# Patient Record
Sex: FEMALE | Race: WHITE | URBAN - METROPOLITAN AREA
[De-identification: names, ages, dates, MRNs, and addresses within clinical notes are randomized per-mention and may not be internally consistent; named-entity substitution may affect disease eponyms.]

---

## 2022-01-03 ENCOUNTER — EMERGENCY (EMERGENCY)
Facility: HOSPITAL | Age: 22
LOS: 1 days | Discharge: OTHER INSTITUTION | End: 2022-01-03
Attending: STUDENT IN AN ORGANIZED HEALTH CARE EDUCATION/TRAINING PROGRAM
Payer: MEDICARE

## 2022-01-03 VITALS
TEMPERATURE: 99 F | HEART RATE: 100 BPM | HEIGHT: 62 IN | WEIGHT: 110.01 LBS | OXYGEN SATURATION: 98 % | SYSTOLIC BLOOD PRESSURE: 135 MMHG | RESPIRATION RATE: 16 BRPM | DIASTOLIC BLOOD PRESSURE: 83 MMHG

## 2022-01-03 DIAGNOSIS — F23 BRIEF PSYCHOTIC DISORDER: ICD-10-CM

## 2022-01-03 DIAGNOSIS — T14.91XA SUICIDE ATTEMPT, INITIAL ENCOUNTER: ICD-10-CM

## 2022-01-03 LAB
ALBUMIN SERPL ELPH-MCNC: 4.8 G/DL — SIGNIFICANT CHANGE UP (ref 3.3–5)
ALP SERPL-CCNC: 91 U/L — SIGNIFICANT CHANGE UP (ref 40–120)
ALT FLD-CCNC: 13 U/L — SIGNIFICANT CHANGE UP (ref 10–45)
AMPHET UR-MCNC: NEGATIVE — SIGNIFICANT CHANGE UP
ANION GAP SERPL CALC-SCNC: 13 MMOL/L — SIGNIFICANT CHANGE UP (ref 5–17)
ANISOCYTOSIS BLD QL: SLIGHT — SIGNIFICANT CHANGE UP
APAP SERPL-MCNC: <15 UG/ML — SIGNIFICANT CHANGE UP (ref 10–30)
APPEARANCE UR: CLEAR — SIGNIFICANT CHANGE UP
AST SERPL-CCNC: 13 U/L — SIGNIFICANT CHANGE UP (ref 10–40)
BARBITURATES UR SCN-MCNC: NEGATIVE — SIGNIFICANT CHANGE UP
BASOPHILS # BLD AUTO: 0 K/UL — SIGNIFICANT CHANGE UP (ref 0–0.2)
BASOPHILS NFR BLD AUTO: 0 % — SIGNIFICANT CHANGE UP (ref 0–2)
BENZODIAZ UR-MCNC: NEGATIVE — SIGNIFICANT CHANGE UP
BILIRUB SERPL-MCNC: 0.7 MG/DL — SIGNIFICANT CHANGE UP (ref 0.2–1.2)
BILIRUB UR-MCNC: NEGATIVE — SIGNIFICANT CHANGE UP
BUN SERPL-MCNC: 13 MG/DL — SIGNIFICANT CHANGE UP (ref 7–23)
CALCIUM SERPL-MCNC: 9.2 MG/DL — SIGNIFICANT CHANGE UP (ref 8.4–10.5)
CHLORIDE SERPL-SCNC: 103 MMOL/L — SIGNIFICANT CHANGE UP (ref 96–108)
CO2 SERPL-SCNC: 22 MMOL/L — SIGNIFICANT CHANGE UP (ref 22–31)
COCAINE METAB.OTHER UR-MCNC: NEGATIVE — SIGNIFICANT CHANGE UP
COLOR SPEC: SIGNIFICANT CHANGE UP
CREAT SERPL-MCNC: 0.6 MG/DL — SIGNIFICANT CHANGE UP (ref 0.5–1.3)
DACRYOCYTES BLD QL SMEAR: SLIGHT — SIGNIFICANT CHANGE UP
DIFF PNL FLD: NEGATIVE — SIGNIFICANT CHANGE UP
ELLIPTOCYTES BLD QL SMEAR: SLIGHT — SIGNIFICANT CHANGE UP
EOSINOPHIL # BLD AUTO: 0.1 K/UL — SIGNIFICANT CHANGE UP (ref 0–0.5)
EOSINOPHIL NFR BLD AUTO: 0.9 % — SIGNIFICANT CHANGE UP (ref 0–6)
ETHANOL SERPL-MCNC: SIGNIFICANT CHANGE UP MG/DL (ref 0–10)
GIANT PLATELETS BLD QL SMEAR: PRESENT — SIGNIFICANT CHANGE UP
GLUCOSE SERPL-MCNC: 144 MG/DL — HIGH (ref 70–99)
GLUCOSE UR QL: NEGATIVE — SIGNIFICANT CHANGE UP
HCG SERPL-ACNC: <2 MIU/ML — SIGNIFICANT CHANGE UP
HCT VFR BLD CALC: 37.9 % — SIGNIFICANT CHANGE UP (ref 34.5–45)
HGB BLD-MCNC: 11.9 G/DL — SIGNIFICANT CHANGE UP (ref 11.5–15.5)
KETONES UR-MCNC: NEGATIVE — SIGNIFICANT CHANGE UP
LEUKOCYTE ESTERASE UR-ACNC: NEGATIVE — SIGNIFICANT CHANGE UP
LYMPHOCYTES # BLD AUTO: 1.63 K/UL — SIGNIFICANT CHANGE UP (ref 1–3.3)
LYMPHOCYTES # BLD AUTO: 15 % — SIGNIFICANT CHANGE UP (ref 13–44)
MANUAL SMEAR VERIFICATION: SIGNIFICANT CHANGE UP
MCHC RBC-ENTMCNC: 21 PG — LOW (ref 27–34)
MCHC RBC-ENTMCNC: 31.4 GM/DL — LOW (ref 32–36)
MCV RBC AUTO: 66.8 FL — LOW (ref 80–100)
METHADONE UR-MCNC: NEGATIVE — SIGNIFICANT CHANGE UP
MICROCYTES BLD QL: SIGNIFICANT CHANGE UP
MONOCYTES # BLD AUTO: 0.67 K/UL — SIGNIFICANT CHANGE UP (ref 0–0.9)
MONOCYTES NFR BLD AUTO: 6.2 % — SIGNIFICANT CHANGE UP (ref 2–14)
NEUTROPHILS # BLD AUTO: 8.48 K/UL — HIGH (ref 1.8–7.4)
NEUTROPHILS NFR BLD AUTO: 77.9 % — HIGH (ref 43–77)
NITRITE UR-MCNC: NEGATIVE — SIGNIFICANT CHANGE UP
OPIATES UR-MCNC: NEGATIVE — SIGNIFICANT CHANGE UP
OXYCODONE UR-MCNC: NEGATIVE — SIGNIFICANT CHANGE UP
PCP SPEC-MCNC: SIGNIFICANT CHANGE UP
PCP UR-MCNC: NEGATIVE — SIGNIFICANT CHANGE UP
PH UR: 6.5 — SIGNIFICANT CHANGE UP (ref 5–8)
PLAT MORPH BLD: NORMAL — SIGNIFICANT CHANGE UP
PLATELET # BLD AUTO: 284 K/UL — SIGNIFICANT CHANGE UP (ref 150–400)
POIKILOCYTOSIS BLD QL AUTO: SLIGHT — SIGNIFICANT CHANGE UP
POTASSIUM SERPL-MCNC: 3.7 MMOL/L — SIGNIFICANT CHANGE UP (ref 3.5–5.3)
POTASSIUM SERPL-SCNC: 3.7 MMOL/L — SIGNIFICANT CHANGE UP (ref 3.5–5.3)
PROT SERPL-MCNC: 7 G/DL — SIGNIFICANT CHANGE UP (ref 6–8.3)
PROT UR-MCNC: NEGATIVE — SIGNIFICANT CHANGE UP
RBC # BLD: 5.67 M/UL — HIGH (ref 3.8–5.2)
RBC # FLD: 15 % — HIGH (ref 10.3–14.5)
RBC BLD AUTO: ABNORMAL
SALICYLATES SERPL-MCNC: <2 MG/DL — LOW (ref 15–30)
SARS-COV-2 RNA SPEC QL NAA+PROBE: SIGNIFICANT CHANGE UP
SCHISTOCYTES BLD QL AUTO: SLIGHT — SIGNIFICANT CHANGE UP
SODIUM SERPL-SCNC: 138 MMOL/L — SIGNIFICANT CHANGE UP (ref 135–145)
SP GR SPEC: 1.01 — LOW (ref 1.01–1.02)
THC UR QL: NEGATIVE — SIGNIFICANT CHANGE UP
UROBILINOGEN FLD QL: NEGATIVE — SIGNIFICANT CHANGE UP
WBC # BLD: 10.88 K/UL — HIGH (ref 3.8–10.5)
WBC # FLD AUTO: 10.88 K/UL — HIGH (ref 3.8–10.5)

## 2022-01-03 PROCEDURE — 99285 EMERGENCY DEPT VISIT HI MDM: CPT | Mod: 25

## 2022-01-03 PROCEDURE — 70450 CT HEAD/BRAIN W/O DYE: CPT | Mod: 26,MA

## 2022-01-03 PROCEDURE — 80307 DRUG TEST PRSMV CHEM ANLYZR: CPT

## 2022-01-03 PROCEDURE — 80053 COMPREHEN METABOLIC PANEL: CPT

## 2022-01-03 PROCEDURE — 93005 ELECTROCARDIOGRAM TRACING: CPT

## 2022-01-03 PROCEDURE — 84702 CHORIONIC GONADOTROPIN TEST: CPT

## 2022-01-03 PROCEDURE — 85025 COMPLETE CBC W/AUTO DIFF WBC: CPT

## 2022-01-03 PROCEDURE — 84443 ASSAY THYROID STIM HORMONE: CPT

## 2022-01-03 PROCEDURE — 70450 CT HEAD/BRAIN W/O DYE: CPT | Mod: MA

## 2022-01-03 PROCEDURE — 93010 ELECTROCARDIOGRAM REPORT: CPT

## 2022-01-03 PROCEDURE — U0005: CPT

## 2022-01-03 PROCEDURE — 81003 URINALYSIS AUTO W/O SCOPE: CPT

## 2022-01-03 PROCEDURE — U0003: CPT

## 2022-01-03 NOTE — ED BEHAVIORAL HEALTH ASSESSMENT NOTE - PSYCHIATRIC ISSUES AND PLAN (INCLUDE STANDING AND PRN MEDICATION)
Klonopin 0.5mg po qHS for insomnia, Will defer standing medications to primary team, PRNS: Haldol/Ativan/Benadryl po for severe agitation Klonopin 0.5mg po qHS for insomnia, Will defer standing medications to primary team, PRNS: Ativan/Benadryl po/IM for severe agitation

## 2022-01-03 NOTE — ED BEHAVIORAL HEALTH ASSESSMENT NOTE - NSACTIVEVENT_PSY_ALL_CORE
Triggering events leading to humiliation, shame, and/or despair (e.g., Loss of relationship, financial or health status) (real or anticipated)/Recent onset of psychiatric illness Triggering events leading to humiliation, shame, and/or despair (e.g., Loss of relationship, financial or health status) (real or anticipated)

## 2022-01-03 NOTE — ED PROVIDER NOTE - PROGRESS NOTE DETAILS
Li PGY3: telepsych consulted Li PGY3: telepsych recommends involuntary admission. Told by father that L ankle swollen. Patient states she twisted ankle 2 days ago, without any pain and has been ambulatory in ED. Telepsych recommends CT head. de NAEL Dejesus, Attending: signed out to me from Omar. Medically clear. To [facility]. 2PC papers to be done by ER attgs. NAEL Dejesus, Attending: called [facility], arranging xfer to [facility]. Pt signed xfer paperwork. Said OK to call father.

## 2022-01-03 NOTE — ED ADULT NURSE REASSESSMENT NOTE - NS ED NURSE REASSESS COMMENT FT1
Received report from CALLY Perez. Pt A&Ox3, awaiting telepsych consult. No complaints at this time. Pt remains on 1:1 for SI

## 2022-01-03 NOTE — ED BEHAVIORAL HEALTH ASSESSMENT NOTE - SUMMARY
Ms. 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 The patient recently started working overnight as a nurse and has not slept in 3-4 days. She presents with depressed mood, delusions of guilt (i.e., that she harmed someone in the hospital and should be arrested), and numerous recent parasuicidal behaviors versus aborted suicide attempts. She has voiced that she wanted to kill herself to her parents and was ambivalent about suicidality today after engaging in suicidal behavior yesterday. She was unable to engage in any type of safety planning. Patient's presentation is concerning for with first episode psychosis, though the differential is broad. Organic causes of psychosis and depression should be ruled out however before psychiatric diagnosis is considered. Patient is at elevated risk of harm to self at this time due to psychosis and suicidal ideation and she requires inpatient hospitalization on a psychiatric unit for safety and stabilization and diagnostic clarity.     Plan:   - Admit to inpatient psychiatry unit on 9.27 pending COVID results  - CT of head recommended to evaluate new onset psychotic symptoms. 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 The patient recently started working overnight as a nurse and has not slept in 3-4 days. She presents with depressed mood, delusions of guilt (i.e., that she harmed someone in the hospital and should be arrested), and numerous recent parasuicidal behaviors versus aborted suicide attempts. She has voiced that she wanted to kill herself to her parents and was ambivalent about suicidality today after engaging in suicidal behavior yesterday. She was unable to engage in any type of safety planning. Patient's presentation is concerning for with first episode psychosis, though the differential is broad. Organic causes of psychosis and depression should be ruled out however before psychiatric diagnosis is considered. Patient is at elevated risk of harm to self at this time due to psychosis and suicidal ideation and she requires inpatient hospitalization on a psychiatric unit for safety and stabilization and diagnostic clarity.     Plan:   - Admit to inpatient psychiatry unit on 9.27, COVID negative unit, 2W at [facility] has bed available, pending acceptance from [facility]  - CT of head recommended to evaluate new onset psychotic symptoms. 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 The patient recently started working overnight as a nurse and has not slept in 3-4 days. She presents with depressed mood, delusions of guilt (i.e., that she harmed someone in the hospital and should be arrested), and numerous recent parasuicidal behaviors versus aborted suicide attempts. She has voiced that she wanted to kill herself to her parents and was ambivalent about suicidality today after engaging in suicidal behavior yesterday. She was unable to engage in any type of safety planning. Patient's presentation is concerning for with first episode psychosis, though the differential is broad. Organic causes of psychosis and depression should be ruled out however before psychiatric diagnosis is considered. Patient is at elevated risk of harm to self at this time due to psychosis and suicidal ideation and she requires inpatient hospitalization on a psychiatric unit for safety and stabilization and diagnostic clarity.     Plan:   - Admit to inpatient psychiatry unit on 9.27, COVID negative unit, 2W at [facility] has bed available, pending acceptance from [facility]  - CT of head recommended to evaluate new onset psychotic symptoms. Discussed with Vishal ED resident  - Klonopin 0.5mg po qhS  - defer standing medications to primary team  - Benadryl/Ativan po/IM for severe agitation

## 2022-01-03 NOTE — ED PROVIDER NOTE - OBJECTIVE STATEMENT
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 She feels like she made a mistake last week and was fighting out it with her parents today. She started to feel very anxious and attempted to jump out of her 2nd story window of her home but was stopped by her family members and EMS called. Unwilling to say if she was trying to end her life. Denies h/o SA's, depression, anxiety, AVH. Lives with her family and no firearms in the household.

## 2022-01-03 NOTE — ED BEHAVIORAL HEALTH ASSESSMENT NOTE - NSPRESENTSXS_PSY_ALL_CORE
Severe anxiety, agitation or panic Depressed mood/Anhedonia/Psychosis/Hopelessness or despair/Global insomnia/Severe anxiety, agitation or panic/Refusal or inability to complete safety plan

## 2022-01-03 NOTE — ED BEHAVIORAL HEALTH ASSESSMENT NOTE - RISK ASSESSMENT
High Acute Suicide Risk Pt is at high acute suicide risk if in the community due to suicidal ideation, parasuicidal behavior versus aborted suicide attempts, depressed mood, insomnia, anxiety, employment stressors, psychotic symptoms, severe guilt. Other risk factors include family history of schizophrenia. Protective factors include stable residence, good supportive family, gainfully employed, no history of suicide attempts, no current suicidal ideation, no history of violence. Given patient's recent Suicidality in the context of insomnia and psychotic symptoms she is at elevated acute risk of harm to self. Risk will be lowered by admission to secure inpatient unit where the patient can be monitored and treated with medications and therapy.

## 2022-01-03 NOTE — ED BEHAVIORAL HEALTH ASSESSMENT NOTE - HPI (INCLUDE ILLNESS QUALITY, SEVERITY, DURATION, TIMING, CONTEXT, MODIFYING FACTORS, ASSOCIATED SIGNS AND SYMPTOMS)
Ms. 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    Per provider note, the patient recently started nursing orientation and feels like she made a mistake last week and had a fight with her parents today. She felt anxious and attempted to jump out of the 2nd story window of her home but was stopped by family members and EMS was called. She was unwilling to state if this was a suicide attempt. She denies a history of suicide attempts. She denies auditory/visual hallucinations. She has no firearms in the household.    On evaluation, the patient is calm and in good behavioral control, though she is guarded. She states that she is in the ED because she had a fight with family. She stated "I think I made a mistake." She stated that she has been under a lot of stress, and she lost a lot of weight in the past month or two (states that she weighed 125lb and now is 111lb). She stated that she has no appetite because she is stressed out. Ms. 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    Per provider note, the patient recently started nursing orientation and feels like she made a mistake last week and had a fight with her parents today. She felt anxious and attempted to jump out of the 2nd story window of her home but was stopped by family members and EMS was called. In the ED she was unwilling to state if this was a suicide attempt. She denies a history of suicide attempts. She denies auditory/visual hallucinations. She has no firearms in the household.    On evaluation, the patient is calm and in good behavioral control, though she is guarded. She states that she is in the ED because she had a fight with family. She stated "I think I made a mistake." She stated that she has been under a lot of stress, and she lost a lot of weight in the past month or two (states that she weighed 125lb and now is 111lb). She stated that she has no appetite because she is stressed out. Ms. 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    Per provider note, the patient recently started nursing orientation and feels like she made a mistake last week and had a fight with her parents today. She felt anxious and attempted to jump out of the 2nd story window of her home but was stopped by family members and EMS was called. In the ED she was unwilling to state if this was a suicide attempt. She denies a history of suicide attempts. She denies auditory/visual hallucinations. She has no firearms in the household.    Collateral obtained from patient's father Sotero. 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 Per dad, this was the first time Doris was unsupervised as she is a new nurse and was previously on orientation. She worked overnight on Monday, Tuesday, and Wednesday and she did not sleep during the day. Her father stated that on Thursday night she started to repeat herself, stating "I'm a bad person, I harmed the patient, I killed the patient, I gotta go to senior living, I gotta end my life, I gotta pay for this."       On evaluation, the patient is calm and in good behavioral control, though she is guarded, answering "I don't know" to many questions. She states that she is in the ED because she had a fight with family. 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 She stated that she "fucked up" at work and "really don't want to talk about it." She referenced that she made a mistake multiple times, but she would not ealborate. She then stated that she maybe give someone the wrong medication and stated;  "I've been making errors, I can't organize my life, I don't know I'm scared." "I think I harmed someone, I don't know." She stated that she has no appetite and lost weight in the past month or two (states that she weighed 125lb and now is 111lb). She reports feeling "edgy" and     She reported that she drinks socially in the summer, she denies use of cannabis, and she denies use of all illicit substances. She denies access to guns. Ms. 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    Collateral obtained from patient's father Sotero. 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 Per dad, this was the first time Doris was unsupervised as a nurse because she is a new nurse and was previously on orientation. She worked overnight on Monday, Tuesday, and Wednesday and she did not sleep during the day and would only take brief "power naps.". Her father stated that on Thursday night she started to repeat herself, stating "I'm a bad person, I harmed the patient, I killed the patient, I gotta go to correction, I gotta end my life, I gotta pay for this." Yesterday, there were three instances where Doris made       On evaluation, the patient is calm and in good behavioral control, though she is guarded, answering "I don't know" to many questions. She states that she is in the ED because she had a fight with family. 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 She stated that she "fucked up" at work and "really don't want to talk about it." She referenced that she made a mistake multiple times, but she would not ealborate. She then stated that she maybe give someone the wrong medication and stated;  "I've been making errors, I can't organize my life, I don't know I'm scared." "I think I harmed someone, I don't know." She stated that she has no appetite and lost weight in the past month or two (states that she weighed 125lb and now is 111lb). She reports feeling "edgy" and     She reported that she drinks socially in the summer, she denies use of cannabis, and she denies use of all illicit substances. She denies access to guns. 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    Collateral obtained from patient's father Sotero. 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 Per dad, this was the first time Doris was unsupervised as a nurse because she is a new nurse and was previously on orientation. She worked overnight on Monday, Tuesday, and Wednesday and she did not sleep during the day because of construction noise in the neighborhood. She has gone at least 3 days with very little sleep. Her father stated that on Thursday night she started to repeat herself, stating "I'm a bad person, I harmed the patient, I killed the patient, I gotta go to [facility], I gotta end my life, I gotta pay for this." She also spends time pacing when she is supposed to be sleeping. When her father tries to tell her that she did not do anything wrong, Doris continues to state that she harmed someone. Yesterday, there were three instances where Doris engaged in concerning behaviors. First, she went to the basement and took out 2 tide pods and her father caught her and asked what she planned to do with the tide pods, she responded "I don't know." She also kept asking her mom about medications in the home, and parents had to hide medications and knives due to concern she would harm herself. Later in the day, Doris went to her room and locked the door. Her dad broke down the door and saw Doris trying to open up the window. She only responded "I don't know I don't know I don't know" when asked what she was doing. Later in the day, her dad says that she seemed to be rational but was "clearly faking it" and she suddenly "bolted" upstairs and ran to the window to try to jump. Her father had to remove her from the window. Her father attempted to get her an appointment with a mental health provider, which is supposed to be later this week.  Dad says that patient's mother has schizophrenia and had similar symptoms, but never this severe. Dad is concerned for the patient's safety and is agreeable with plan for psychiatric hospitalization. Per dad she has always been somewhat anxious, but she has never received psychiatric or psychological treatment. She has never tried to harm herself or talked about killing herself in the past and she has never made a suicide attempt. She does not use drugs or substances. She has never been on psychiatric medication. This behavior is out of character for her, and he is concerned given patient's family history.     On evaluation, the patient is calm and in good behavioral control, though she is guarded with little spontaneous speech, answering "I don't know" to many questions. Her affect is dysphoric and she makes very poor eye contact. She states that she is in the ED because she had a fight with family, including her brother. She stated: "I think I made a mistake." She stated that she recently started work as a nurse at [employer] on a inpatient [employer] medicine unit. She stated that she "fucked up at work." She referenced that she made a mistake at work multiple times, but she would not elaborate, stating "I don't know" or "I can't remember" when asked for details. She later stated that she maybe give someone the wrong medication and then stated;  "I've been making errors, I can't organize my life, I don't know I'm scared." "I think I harmed someone, I don't know." She stated that she has been feeling paranoid lately, which she described as "edgy." She also reports sad mood, though notes that she has happy moments and she denies that she feels persistently sad. She corroborated that she attempted to jump out of a window yesterday and her father stopped her, but she stated "I don't know" when asked if this was a suicide attempt. She acknowledged that she had experienced passive suicidal ideation recently, but she would not elaborate. She shook her head no when asked if she had active suicidal ideation at any point. She denied history of suicide attempts. She has been biting at her arms superficially per dad over last few days. She denies current suicidal ideation, intent, and plan. When asked how she felt that her family stopped her from jumping, she stated "At least I'm not paralyzed." She stated that she has no appetite and lost weight in the past month or two (states that she weighed 125lb and now is 111lb). She states that she usually likes going to the gym, but she has been unable to do this, partially because she is not eating enough. She reported that she feels fatigued, but when she tries to sleep she is unable to and she paces back and fourth. When asked about AH, she stated "I don't know." Denies VH. Denies homicidal, violent ideation, intent, or plan. When asked if she ever had a period of time where she had decreased need for sleep, she referenced the last few nights, but she also noted that she has been feeling tired. No evidence of elevated, euphoric, irritable, or expansive mood. She reported that she drinks socially in the summer, she denies use of cannabis, and she denies use of all illicit substances. She denies access to guns. She also denies that she has any medical issues and she denies that she has any physical pain or physical symptoms. She had 2 covid vaccines.

## 2022-01-03 NOTE — ED ADULT NURSE NOTE - CAS EDN DISCHARGE ASSESSMENT
Received call from patient's daughter stating that patient cannot come in for procedure on 3/27/19 due to transportation. Rescheduled patient to 4/9/19 at 1230 with MAC. New written instructions mailed to patient.   
Alert and oriented to person, place and time

## 2022-01-03 NOTE — ED PROVIDER NOTE - CLINICAL SUMMARY MEDICAL DECISION MAKING FREE TEXT BOX
20yo female BIBEMS after possible SA in context of significant stress at new job. Well appearing. Will need psych eval for possible inpatient admission pending collateral discussion and labs.

## 2022-01-03 NOTE — ED BEHAVIORAL HEALTH ASSESSMENT NOTE - NSHISTORFACTOR_PSY_ALL_CORE
Family History of psychiatric diagnoses requiring hospitalization Family History of Suicidal behavior/Family History of psychiatric diagnoses requiring hospitalization

## 2022-01-03 NOTE — ED BEHAVIORAL HEALTH ASSESSMENT NOTE - DESCRIPTION
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

## 2022-01-03 NOTE — ED PROVIDER NOTE - ATTENDING CONTRIBUTION TO CARE
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 Per the report, pt w/ no medical history, was brought in because she attempted to jump out of a second story window to kill herself. Pt brother witnessed episode and physically had to grab her to prevent her from hurting herself. Per the report, brother stated pt made a mistake at work and pt was feeling upset about it. Feeling overwhelmed and reports that she cannot take it. 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 Per the report, pt w/ no medical history, was brought in because she attempted to jump out of a second story window to kill herself. Pt brother witnessed episode and physically had to grab her to prevent her from hurting herself. Per the report, brother stated pt made a mistake at work and pt was feeling upset about it. Feeling overwhelmed and reports that she cannot take it. on exam, pt is awake and alert, she is avoiding eye contact appears ambivalent regarding the events that occurred today, she has clear lungs soft abdomen, no leg edema, 2+ radial pulse, pt reports feeling overwhelmed,  concern for suicidal attempt, given episode, will obtain labs, consult psychiatry

## 2022-01-03 NOTE — ED PROVIDER NOTE - PHYSICAL EXAMINATION
Physical Exam:  Gen: NAD, AOx3, non-toxic appearing, able to ambulate without assistance  Head: NCAT  HEENT: EOMI, PEERLA, normal conjunctiva, tongue midline, oral mucosa moist  Lung: CTAB, no respiratory distress, no wheezes/rhonchi/rales B/L, speaking in full sentences  CV: RRR, no murmurs, rubs or gallops, distal pulses 2+ b/l  Abd: soft, NT, ND, no guarding, no rigidity, no rebound tenderness, no CVA tenderness   Skin: Warm, well perfused, no rash  Psych: normal affect, calm

## 2022-01-03 NOTE — ED BEHAVIORAL HEALTH ASSESSMENT NOTE - DIFFERENTIAL
brief psychotic disorder, major depressive disorder with psychotic features, Bipolar disorder with psychosis, unspecified psychosis, organic cause of psychosis/depression should also be ruled out for diagnostic clarity.

## 2022-01-03 NOTE — ED BEHAVIORAL HEALTH ASSESSMENT NOTE - DETAILS
Mother has a history of schizophrenia. Per patient - maternal first cousin had a completed suicide recently [facility] ED notified of admission pending COVID result/bed Patient had multiple suicidal gestures versus aborted attempts in the past few days - she tried to jump out of a window from her house x 2, and she took tide pods  from her home. Pt states she doesn't know if this was a suicide attempt or not. Handoff to SUJEY

## 2022-01-03 NOTE — ED BEHAVIORAL HEALTH ASSESSMENT NOTE - NSSUICRSKFACTOR_PSY_ALL_CORE
Presenting Symptoms/Historical Factors/Activating Events/Stressors Presenting Symptoms/Historical Factors/Treatment Related Factors/Activating Events/Stressors

## 2022-01-03 NOTE — ED BEHAVIORAL HEALTH ASSESSMENT NOTE - OTHER
Family guarded normal gait per ED resident Vishal When asked about AH, patient responded "I don't know" Pending COVID

## 2022-01-03 NOTE — ED ADULT NURSE NOTE - OBJECTIVE STATEMENT
21 y.o female, A&Ox3, no PMH, pt presents to ED for SI. 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 pt states she got into an argument with her parents at home and she verbally stated she was going to jump out of her second story window. pt states her mother has a history of schizophrenia. at this time the pt denies wanting to harm herself or others. pt denies drinking alcohol or drug use. pt placed on 1:1 observation at this time, safety and comfort provided, security was called to collect belongings.

## 2022-01-04 ENCOUNTER — INPATIENT (INPATIENT)
Facility: HOSPITAL | Age: 22
LOS: 14 days | Discharge: HOME | End: 2022-01-19
Attending: PSYCHIATRY & NEUROLOGY | Admitting: PSYCHIATRY & NEUROLOGY
Payer: COMMERCIAL

## 2022-01-04 VITALS
WEIGHT: 110.23 LBS | TEMPERATURE: 98 F | HEART RATE: 100 BPM | RESPIRATION RATE: 16 BRPM | OXYGEN SATURATION: 100 % | HEIGHT: 60 IN

## 2022-01-04 VITALS
OXYGEN SATURATION: 98 % | RESPIRATION RATE: 18 BRPM | SYSTOLIC BLOOD PRESSURE: 114 MMHG | TEMPERATURE: 98 F | HEART RATE: 93 BPM | DIASTOLIC BLOOD PRESSURE: 70 MMHG

## 2022-01-04 DIAGNOSIS — F33.9 MAJOR DEPRESSIVE DISORDER, RECURRENT, UNSPECIFIED: ICD-10-CM

## 2022-01-04 DIAGNOSIS — F41.1 GENERALIZED ANXIETY DISORDER: ICD-10-CM

## 2022-01-04 DIAGNOSIS — F43.20 ADJUSTMENT DISORDER, UNSPECIFIED: ICD-10-CM

## 2022-01-04 LAB — TSH SERPL-MCNC: 0.91 UIU/ML — SIGNIFICANT CHANGE UP (ref 0.27–4.2)

## 2022-01-04 PROCEDURE — 99222 1ST HOSP IP/OBS MODERATE 55: CPT

## 2022-01-04 RX ORDER — MAGNESIUM HYDROXIDE 400 MG/1
30 TABLET, CHEWABLE ORAL DAILY
Refills: 0 | Status: DISCONTINUED | OUTPATIENT
Start: 2022-01-04 | End: 2022-01-19

## 2022-01-04 RX ORDER — RISPERIDONE 4 MG/1
1 TABLET ORAL AT BEDTIME
Refills: 0 | Status: DISCONTINUED | OUTPATIENT
Start: 2022-01-04 | End: 2022-01-06

## 2022-01-04 RX ORDER — HALOPERIDOL DECANOATE 100 MG/ML
2 INJECTION INTRAMUSCULAR EVERY 6 HOURS
Refills: 0 | Status: DISCONTINUED | OUTPATIENT
Start: 2022-01-04 | End: 2022-01-05

## 2022-01-04 RX ORDER — ACETAMINOPHEN 500 MG
650 TABLET ORAL EVERY 6 HOURS
Refills: 0 | Status: DISCONTINUED | OUTPATIENT
Start: 2022-01-04 | End: 2022-01-19

## 2022-01-04 RX ORDER — HALOPERIDOL DECANOATE 100 MG/ML
2 INJECTION INTRAMUSCULAR ONCE
Refills: 0 | Status: DISCONTINUED | OUTPATIENT
Start: 2022-01-04 | End: 2022-01-19

## 2022-01-04 RX ADMIN — RISPERIDONE 0.5 MILLIGRAM(S): 4 TABLET ORAL at 20:02

## 2022-01-04 RX ADMIN — HALOPERIDOL DECANOATE 2 MILLIGRAM(S): 100 INJECTION INTRAMUSCULAR at 20:49

## 2022-01-04 NOTE — PSYCHIATRIC REHAB INITIAL EVALUATION - NSBHSTRENGTHHOME_PSY_ALL_CORE
Residential stability, patient reported feeling safe at home. 
FAMILY HISTORY:  Father  Still living? No  FH: hypertension, Age at diagnosis: Age Unknown

## 2022-01-04 NOTE — ED ADULT NURSE REASSESSMENT NOTE - NS ED NURSE REASSESS COMMENT FT1
FLORENCIO Kauffman spoke with Gene (tele psych) 2PC forms have been received and [facility] awaiting name of accepting physician from [facility].

## 2022-01-04 NOTE — BH INPATIENT PSYCHIATRY ASSESSMENT NOTE - RISK ASSESSMENT
Pt is at high acute suicide risk if in the community due to suicidal ideation, parasuicidal behavior versus aborted suicide attempts, depressed mood, insomnia, anxiety, employment stressors, psychotic symptoms, severe guilt. Other risk factors include family history of schizophrenia. Protective factors include stable residence, good supportive family, gainfully employed, no history of suicide attempts, no current suicidal ideation, no history of violence. Given patient's recent Suicidality in the context of insomnia and psychotic symptoms she is at elevated acute risk of harm to self. Risk will be lowered by admission to secure inpatient unit where the patient can be monitored and treated with medications and therapy.

## 2022-01-04 NOTE — BH INPATIENT PSYCHIATRY ASSESSMENT NOTE - DETAILS
Patient had multiple suicidal gestures versus aborted attempts in the past few days - she tried to jump out of a window from her house x 2, and she took tide pods  from her home. Pt states she doesn't know if this was a suicide attempt or not. Mother has a history of schizophrenia. Per patient - maternal first cousin had a completed suicide recently

## 2022-01-04 NOTE — BH PATIENT PROFILE - FALL HARM RISK - UNIVERSAL INTERVENTIONS
Bed in lowest position, wheels locked, appropriate side rails in place/Call bell, personal items and telephone in reach/Instruct patient to call for assistance before getting out of bed or chair/Non-slip footwear when patient is out of bed/[place] to call system/Physically safe environment - no spills, clutter or unnecessary equipment/Purposeful Proactive Rounding/Room/bathroom lighting operational, light cord in reach

## 2022-01-04 NOTE — BH INPATIENT PSYCHIATRY ASSESSMENT NOTE - HPI (INCLUDE ILLNESS QUALITY, SEVERITY, DURATION, TIMING, CONTEXT, MODIFYING FACTORS, ASSOCIATED SIGNS AND SYMPTOMS)
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    Collateral obtained from patient's father Sotero. 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 Per dad, this was the first time Doris was unsupervised as a nurse because she is a new nurse and was previously on orientation. She worked overnight on Monday, Tuesday, and Wednesday and she did not sleep during the day because of construction noise in the neighborhood. She has gone at least 3 days with very little sleep. Her father stated that on Thursday night she started to repeat herself, stating "I'm a bad person, I harmed the patient, I killed the patient, I gotta go to alf, I gotta end my life, I gotta pay for this." She also spends time pacing when she is supposed to be sleeping. When her father tries to tell her that she did not do anything wrong, Doris continues to state that she harmed someone. Yesterday, there were three instances where Doris engaged in concerning behaviors. First, she went to the basement and took out 2 tide pods and her father caught her and asked what she planned to do with the tide pods, she responded "I don't know." She also kept asking her mom about medications in the home, and parents had to hide medications and knives due to concern she would harm herself. Later in the day, Doris went to her room and locked the door. Her dad broke down the door and saw Doris trying to open up the window. She only responded "I don't know I don't know I don't know" when asked what she was doing. Later in the day, her dad says that she seemed to be rational but was "clearly faking it" and she suddenly "bolted" upstairs and ran to the window to try to jump. Her father had to remove her from the window. Her father attempted to get her an appointment with a mental health provider, which is supposed to be later this week.  Dad says that patient's mother has schizophrenia and had similar symptoms, but never this severe. Dad is concerned for the patient's safety and is agreeable with plan for psychiatric hospitalization. Per dad she has always been somewhat anxious, but she has never received psychiatric or psychological treatment. She has never tried to harm herself or talked about killing herself in the past and she has never made a suicide attempt. She does not use drugs or substances. She has never been on psychiatric medication. This behavior is out of character for her, and he is concerned given patient's family history.     On evaluation, the patient is calm and in good behavioral control, though she is guarded with little spontaneous speech, answering "I don't know" to many questions. Her affect is dysphoric and she makes very poor eye contact. She states that she is in the ED because she had a fight with family, including her brother. 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 She stated that she "fucked up at work." She referenced that she made a mistake at work multiple times, but she would not elaborate, stating "I don't know" or "I can't remember" when asked for details. She later stated that she maybe give someone the wrong medication and then stated;  "I've been making errors, I can't organize my life, I don't know I'm scared." "I think I harmed someone, I don't know." She stated that she has been feeling paranoid lately, which she described as "edgy." She also reports sad mood, though notes that she has happy moments and she denies that she feels persistently sad. She corroborated that she attempted to jump out of a window yesterday and her father stopped her, but she stated "I don't know" when asked if this was a suicide attempt. She acknowledged that she had experienced passive suicidal ideation recently, but she would not elaborate. She shook her head no when asked if she had active suicidal ideation at any point. She denied history of suicide attempts. She has been biting at her arms superficially per dad over last few days. She denies current suicidal ideation, intent, and plan. When asked how she felt that her family stopped her from jumping, she stated "At least I'm not paralyzed." She stated that she has no appetite and lost weight in the past month or two (states that she weighed 125lb and now is 111lb). She states that she usually likes going to the gym, but she has been unable to do this, partially because she is not eating enough. She reported that she feels fatigued, but when she tries to sleep she is unable to and she paces back and fourth. When asked about AH, she stated "I don't know." Denies VH. Denies homicidal, violent ideation, intent, or plan. When asked if she ever had a period of time where she had decreased need for sleep, she referenced the last few nights, but she also noted that she has been feeling tired. No evidence of elevated, euphoric, irritable, or expansive mood. She reported that she drinks socially in the summer, she denies use of cannabis, and she denies use of all illicit substances. She denies access to guns. She also denies that she has any medical issues and she denies that she has any physical pain or physical symptoms. She had 2 covid vaccines".     On assessment this morning, patient reported feeling overwhelmed, continued to perseverate about making errors. 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 Patient was perseverating on making multiple medication errors. Patient also reported self injurious behaviors such banging her foot on the bed frame out of anger. Patient also attempted to jump out the window and when questioned about this, reported that she was trying to scare her father.  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    Collateral obtained from patient's father Sotero. 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 Per dad, this was the first time Doris was unsupervised as a nurse because she is a new nurse and was previously on orientation. She worked overnight on Monday, Tuesday, and Wednesday and she did not sleep during the day because of construction noise in the neighborhood. She has gone at least 3 days with very little sleep. Her father stated that on Thursday night she started to repeat herself, stating "I'm a bad person, I harmed the patient, I killed the patient, I gotta go to shelter, I gotta end my life, I gotta pay for this." She also spends time pacing when she is supposed to be sleeping. When her father tries to tell her that she did not do anything wrong, Doris continues to state that she harmed someone. Yesterday, there were three instances where Doris engaged in concerning behaviors. First, she went to the basement and took out 2 tide pods and her father caught her and asked what she planned to do with the tide pods, she responded "I don't know." She also kept asking her mom about medications in the home, and parents had to hide medications and knives due to concern she would harm herself. Later in the day, Doris went to her room and locked the door. Her dad broke down the door and saw Doris trying to open up the window. She only responded "I don't know I don't know I don't know" when asked what she was doing. Later in the day, her dad says that she seemed to be rational but was "clearly faking it" and she suddenly "bolted" upstairs and ran to the window to try to jump. Her father had to remove her from the window. Her father attempted to get her an appointment with a mental health provider, which is supposed to be later this week.  Dad says that patient's mother has schizophrenia and had similar symptoms, but never this severe. Dad is concerned for the patient's safety and is agreeable with plan for psychiatric hospitalization. Per dad she has always been somewhat anxious, but she has never received psychiatric or psychological treatment. She has never tried to harm herself or talked about killing herself in the past and she has never made a suicide attempt. She does not use drugs or substances. She has never been on psychiatric medication. This behavior is out of character for her, and he is concerned given patient's family history.     On evaluation, the patient is calm and in good behavioral control, though she is guarded with little spontaneous speech, answering "I don't know" to many questions. Her affect is dysphoric and she makes very poor eye contact. She states that she is in the ED because she had a fight with family, including her brother. 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 She stated that she "fucked up at work." She referenced that she made a mistake at work multiple times, but she would not elaborate, stating "I don't know" or "I can't remember" when asked for details. She later stated that she maybe give someone the wrong medication and then stated;  "I've been making errors, I can't organize my life, I don't know I'm scared." "I think I harmed someone, I don't know." She stated that she has been feeling paranoid lately, which she described as "edgy." She also reports sad mood, though notes that she has happy moments and she denies that she feels persistently sad. She corroborated that she attempted to jump out of a window yesterday and her father stopped her, but she stated "I don't know" when asked if this was a suicide attempt. She acknowledged that she had experienced passive suicidal ideation recently, but she would not elaborate. She shook her head no when asked if she had active suicidal ideation at any point. She denied history of suicide attempts. She has been biting at her arms superficially per dad over last few days. She denies current suicidal ideation, intent, and plan. When asked how she felt that her family stopped her from jumping, she stated "At least I'm not paralyzed." She stated that she has no appetite and lost weight in the past month or two (states that she weighed 125lb and now is 111lb). She states that she usually likes going to the gym, but she has been unable to do this, partially because she is not eating enough. She reported that she feels fatigued, but when she tries to sleep she is unable to and she paces back and fourth. When asked about AH, she stated "I don't know." Denies VH. Denies homicidal, violent ideation, intent, or plan. When asked if she ever had a period of time where she had decreased need for sleep, she referenced the last few nights, but she also noted that she has been feeling tired. No evidence of elevated, euphoric, irritable, or expansive mood. She reported that she drinks socially in the summer, she denies use of cannabis, and she denies use of all illicit substances. She denies access to guns. She also denies that she has any medical issues and she denies that she has any physical pain or physical symptoms. She had 2 covid vaccines".     On assessment this morning, patient reported feeling overwhelmed, continued to perseverate about making errors. 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 Patient was perseverating on making multiple medication errors. Patient also reported self injurious behaviors such banging her foot on the bed frame out of anger. Patient also attempted to jump out the window and when questioned about this, reported that she was trying to scare her father. Patient reported that she felt paranoid during the orientation (on day shift), felt that patients were trying to harm her. Patient reported feeling overwhelmed, reported poor sleep and appetite due to increasing anxiety. Denied medical issues. Patient denied feeling depressed or sad. Patient denied substance use. Currently denied suicidal ideation, intent or plan.

## 2022-01-04 NOTE — BH INPATIENT PSYCHIATRY ASSESSMENT NOTE - CURRENT MEDICATION
MEDICATIONS  (STANDING):  risperiDONE   Tablet 0.5 milliGRAM(s) Oral at bedtime    MEDICATIONS  (PRN):  acetaminophen     Tablet .. 650 milliGRAM(s) Oral every 6 hours PRN Mild Pain (1 - 3), Moderate Pain (4 - 6)  aluminum hydroxide/magnesium hydroxide/simethicone Suspension 30 milliLiter(s) Oral every 6 hours PRN Dyspepsia  haloperidol     Tablet 2 milliGRAM(s) Oral every 6 hours PRN agitation  haloperidol    Injectable 2 milliGRAM(s) IntraMuscular once PRN severe agitation  LORazepam     Tablet 1 milliGRAM(s) Oral every 6 hours PRN anxiety  LORazepam   Injectable 1 milliGRAM(s) IntraMuscular once PRN Agitation  magnesium hydroxide Suspension 30 milliLiter(s) Oral daily PRN Constipation   MEDICATIONS  (STANDING):  clonazePAM  Tablet 0.5 milliGRAM(s) Oral two times a day  risperiDONE   Tablet 1 milliGRAM(s) Oral at bedtime    MEDICATIONS  (PRN):  acetaminophen     Tablet .. 650 milliGRAM(s) Oral every 6 hours PRN Mild Pain (1 - 3), Moderate Pain (4 - 6)  aluminum hydroxide/magnesium hydroxide/simethicone Suspension 30 milliLiter(s) Oral every 6 hours PRN Dyspepsia  diphenhydrAMINE 25 milliGRAM(s) Oral every 6 hours PRN agitation, EPS prophylaxis  diphenhydrAMINE Injectable 25 milliGRAM(s) IntraMuscular once PRN agitation, EPS prophylaxis  haloperidol     Tablet 5 milliGRAM(s) Oral every 6 hours PRN agitation  haloperidol    Injectable 2 milliGRAM(s) IntraMuscular once PRN severe agitation  LORazepam     Tablet 2 milliGRAM(s) Oral every 6 hours PRN anxiety  LORazepam   Injectable 1 milliGRAM(s) IntraMuscular once PRN Agitation  magnesium hydroxide Suspension 30 milliLiter(s) Oral daily PRN Constipation

## 2022-01-04 NOTE — BH INPATIENT PSYCHIATRY ASSESSMENT NOTE - NSPRESENTSXS_PSY_ALL_CORE
Depressed mood/Anhedonia/Psychosis/Hopelessness or despair/Global insomnia/Severe anxiety, agitation or panic/Refusal or inability to complete safety plan

## 2022-01-04 NOTE — BH INPATIENT PSYCHIATRY ASSESSMENT NOTE - NSBHCHARTREVIEWVS_PSY_A_CORE FT
Vital Signs Last 24 Hrs  T(C): 36.9 (01-04-22 @ 08:51), Max: 37.2 (01-04-22 @ 02:44)  T(F): 98.5 (01-04-22 @ 08:51), Max: 99 (01-04-22 @ 02:44)  HR: 100 (01-04-22 @ 08:51) (85 - 100)  BP: 114/70 (01-04-22 @ 07:14) (114/70 - 142/95)  BP(mean): --  RR: 16 (01-04-22 @ 08:51) (16 - 18)  SpO2: 100% (01-04-22 @ 08:51) (97% - 100%)    Orthostatic VS  01-04-22 @ 08:51  Lying BP: --/-- HR: --  Sitting BP: 128/88 HR: 96  Standing BP: 120/92 HR: 84  Site: --  Mode: --   Vital Signs Last 24 Hrs  T(C): 36.8 (01-05-22 @ 08:15), Max: 36.8 (01-05-22 @ 08:15)  T(F): 98.2 (01-05-22 @ 08:15), Max: 98.2 (01-05-22 @ 08:15)  HR: --  BP: --  BP(mean): --  RR: 17 (01-05-22 @ 08:15) (17 - 17)  SpO2: --    Orthostatic VS  01-05-22 @ 08:15  Lying BP: --/-- HR: --  Sitting BP: 110/67 HR: 104  Standing BP: 113/67 HR: 111  Site: --  Mode: --  Orthostatic VS  01-04-22 @ 08:51  Lying BP: --/-- HR: --  Sitting BP: 128/88 HR: 96  Standing BP: 120/92 HR: 84  Site: --  Mode: --

## 2022-01-04 NOTE — BH INPATIENT PSYCHIATRY ASSESSMENT NOTE - CASE SUMMARY
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 She presents with delusions of guilt (i.e., that she harmed someone in the hospital and should be arrested), and numerous recent parasuicidal behaviors versus aborted suicide attempts. She has voiced that she wanted to kill herself to her parents and was ambivalent about suicidality today after engaging in suicidal behavior yesterday.  Patient's presentation is concerning for with first episode psychosis, though the differential is broad.  Patient is at elevated risk of harm to self at this time due to psychosis and suicidal ideation and she requires inpatient hospitalization on a psychiatric unit for safety and stabilization and diagnostic clarity.  Agree with plan as stated and will monitor on routine checks as no indication for CO in a locked, supervised setting.

## 2022-01-04 NOTE — BH INPATIENT PSYCHIATRY ASSESSMENT NOTE - NSBHMETABOLIC_PSY_ALL_CORE_FT
BMI: BMI (kg/m2): 21.5 (01-04-22 @ 08:51)  HbA1c:   Glucose:   BP: --  Lipid Panel:  BMI: BMI (kg/m2): 21.5 (01-04-22 @ 08:51)  HbA1c: A1C with Estimated Average Glucose Result: 4.8 % (01-05-22 @ 09:56)    Glucose:   BP: --  Lipid Panel: Date/Time: 01-05-22 @ 09:56  Cholesterol, Serum: 95  Direct LDL: --  HDL Cholesterol, Serum: 46  Total Cholesterol/HDL Ration Measurement: --  Triglycerides, Serum: 34

## 2022-01-04 NOTE — PSYCHIATRIC REHAB INITIAL EVALUATION - NSBHPRRECOMMEND_PSY_ALL_CORE
Writer met with patient to orient her to psychiatric rehabilitation staff and services. Throughout the session, patient was pleasant, minimally engaged, and guarded with personal history. Patient identified her reason for admission as having a mental breakdown and attempting to harm herself. 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 Patient denied SI, HI, AH, and VH, and reported feeling that she does not need inpatient level of care. Patient is eager for discharge and exhibited limited insight into her symptoms. Per chart, patient presents with no formal psychiatric history, suicide attempt (i.e. gesturing to open window, seeking medications from parents, seeking Tide pods), and paranoia (i.e. concerns about own medical malpractice). Writer established a treatment goal for the patient to work on over the next 7 days. Patient expressed interest in seeking an outpatient therapist to support her recovery at discharge. Psychiatric rehabilitation staff will provide encouragement, support, psychotherapy, and psychoeducation to assist the patient in the progression of her treatment goal.

## 2022-01-04 NOTE — BH INPATIENT PSYCHIATRY ASSESSMENT NOTE - NSBHASSESSSUMMFT_PSY_ALL_CORE
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 by EMS activated by her family due to a suicide attempt. 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 She presents with delusions of guilt (i.e., that she harmed someone in the hospital and should be arrested), and numerous recent parasuicidal behaviors versus aborted suicide attempts. She has voiced that she wanted to kill herself to her parents and was ambivalent about suicidality today after engaging in suicidal behavior yesterday.  Patient's presentation is concerning for with first episode psychosis, though the differential is broad.  Patient is at elevated risk of harm to self at this time due to psychosis and suicidal ideation and she requires inpatient hospitalization on a psychiatric unit for safety and stabilization and diagnostic clarity.   1. admit to inpatient psychiatry  2. routine care, no need for CO at this time, patient denied suicidal ideation  3. start Risperdal 0.5mg hs for psychosis--discussed indications, SE  4. tx will include milieu, individual therapy

## 2022-01-05 LAB
A1C WITH ESTIMATED AVERAGE GLUCOSE RESULT: 4.8 % — SIGNIFICANT CHANGE UP (ref 4–5.6)
CHOLEST SERPL-MCNC: 95 MG/DL — SIGNIFICANT CHANGE UP
COVID-19 SPIKE DOMAIN AB INTERP: POSITIVE
COVID-19 SPIKE DOMAIN ANTIBODY RESULT: >250 U/ML — HIGH
ESTIMATED AVERAGE GLUCOSE: 91 — SIGNIFICANT CHANGE UP
HDLC SERPL-MCNC: 46 MG/DL — LOW
LIPID PNL WITH DIRECT LDL SERPL: 42 MG/DL — SIGNIFICANT CHANGE UP
NON HDL CHOLESTEROL: 49 MG/DL — SIGNIFICANT CHANGE UP
SARS-COV-2 IGG+IGM SERPL QL IA: >250 U/ML — HIGH
SARS-COV-2 IGG+IGM SERPL QL IA: POSITIVE
TRIGL SERPL-MCNC: 34 MG/DL — SIGNIFICANT CHANGE UP

## 2022-01-05 PROCEDURE — 99232 SBSQ HOSP IP/OBS MODERATE 35: CPT

## 2022-01-05 RX ORDER — CLONAZEPAM 1 MG
0.5 TABLET ORAL
Refills: 0 | Status: DISCONTINUED | OUTPATIENT
Start: 2022-01-05 | End: 2022-01-06

## 2022-01-05 RX ORDER — LANOLIN ALCOHOL/MO/W.PET/CERES
3 CREAM (GRAM) TOPICAL ONCE
Refills: 0 | Status: COMPLETED | OUTPATIENT
Start: 2022-01-05 | End: 2022-01-05

## 2022-01-05 RX ORDER — DIPHENHYDRAMINE HCL 50 MG
25 CAPSULE ORAL EVERY 6 HOURS
Refills: 0 | Status: DISCONTINUED | OUTPATIENT
Start: 2022-01-05 | End: 2022-01-19

## 2022-01-05 RX ORDER — DIPHENHYDRAMINE HCL 50 MG
25 CAPSULE ORAL ONCE
Refills: 0 | Status: DISCONTINUED | OUTPATIENT
Start: 2022-01-05 | End: 2022-01-19

## 2022-01-05 RX ORDER — HALOPERIDOL DECANOATE 100 MG/ML
5 INJECTION INTRAMUSCULAR EVERY 6 HOURS
Refills: 0 | Status: DISCONTINUED | OUTPATIENT
Start: 2022-01-05 | End: 2022-01-19

## 2022-01-05 RX ADMIN — RISPERIDONE 1 MILLIGRAM(S): 4 TABLET ORAL at 20:14

## 2022-01-05 RX ADMIN — HALOPERIDOL DECANOATE 2 MILLIGRAM(S): 100 INJECTION INTRAMUSCULAR at 14:40

## 2022-01-05 RX ADMIN — HALOPERIDOL DECANOATE 2 MILLIGRAM(S): 100 INJECTION INTRAMUSCULAR at 04:51

## 2022-01-05 RX ADMIN — Medication 0.5 MILLIGRAM(S): at 20:13

## 2022-01-05 RX ADMIN — Medication 1 MILLIGRAM(S): at 14:39

## 2022-01-05 RX ADMIN — Medication 1 MILLIGRAM(S): at 04:51

## 2022-01-05 NOTE — BH INPATIENT PSYCHIATRY PROGRESS NOTE - NSBHASSESSSUMMFT_PSY_ALL_CORE
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 She presents with delusions of guilt (i.e., that she harmed someone in the hospital and should be arrested), and numerous recent parasuicidal behaviors versus aborted suicide attempts. She has voiced that she wanted to kill herself to her parents and was ambivalent about suicidality today after engaging in suicidal behavior yesterday.  Patient's presentation is concerning for with first episode psychosis, though the differential is broad.  Patient is at elevated risk of harm to self at this time due to psychosis and suicidal ideation and she requires inpatient hospitalization on a psychiatric unit for safety and stabilization and diagnostic clarity.     on assessment today, patient with paranoid ideations, suicidal ideation, no plan or intent, with SIB, and AH, non-command to harm self/ others.     1. admit to inpatient psychiatry  2. routine care, no need for CO at this time, patient denied suicidal ideation  3. increase Risperdal  1 mg hs for psychosis--discussed indications, SE, start Klonopin 0.5mg bid and increase prns to Haldol 5mg oral and ativan 2mg prn  4. tx will include milieu, individual therapy

## 2022-01-05 NOTE — BH SOCIAL WORK INITIAL PSYCHOSOCIAL EVALUATION - NSBHCOGDIS_PSY_ALL_CORE
Patient : Aris Landa Age: 79 year old Sex: male   MRN: [id number] Encounter Date: 6/17/2018    P06/P6    History     Chief Complaint   Patient presents with   • Cough     HPI  6/17/2018  9:59 AM Aris Landa is a 79 year old male who presents to the ED c/o productive cough that began four days ago. He also complains of SOB and decreased appetite but denies fever, vomiting, or diarrhea. He is taking Theophylline (150 mg), a Ipratropium bromide + levosalbutamol inhaler, and a Salmeterol + fluticasone propionate inhaler. He has not taken Prednisone recently and does not have a history of heart problems. He returned from [facility] six months ago. There are no further complaints or modifying factors at this time.    PCP: No Pcp    No Known Allergies    Prior to Admission Medications    AZITHROMYCIN (ZITHROMAX Z-ASAD) 250 MG TABLET    2 pills po day 1 then 1 pill po day 2-5    PREDNISONE (DELTASONE) 20 MG TABLET    Take 2 tablets by mouth daily.       History reviewed. No pertinent past medical history.    History reviewed. No pertinent past surgical history.    History reviewed. No pertinent family history.      Social History   Substance Use Topics   • Smoking status: Never Smoker   • Smokeless tobacco: Never Used   • Alcohol use No       Review of Systems   Constitutional: Positive for appetite change (decreased). Negative for chills and fever.   HENT: Negative for congestion, dental problem, ear pain, nosebleeds, sore throat and trouble swallowing.    Respiratory: Positive for cough and shortness of breath. Negative for chest tightness.    Cardiovascular: Negative for chest pain, palpitations and leg swelling.   Gastrointestinal: Negative for abdominal distention, abdominal pain, constipation, diarrhea, nausea and vomiting.   Genitourinary: Negative for decreased urine volume, difficulty urinating, discharge, dysuria, frequency, hematuria and urgency.   Musculoskeletal: Negative for arthralgias, back pain, joint  swelling, myalgias, neck pain and neck stiffness.   Skin: Negative for rash.   Neurological: Negative for dizziness, weakness and headaches.   Hematological: Does not bruise/bleed easily.   Psychiatric/Behavioral: Negative for confusion. The patient is not nervous/anxious.        Physical Exam     ED Triage Vitals [06/17/18 0952]   ED Triage Vitals Group      Temp 98.3 °F (36.8 °C)      Pulse 101      Resp 20      /61      SpO2 93 %      EtCO2 mmHg       Height 5' 6\" (1.676 m)      Weight 110 lb (49.9 kg)      Weight Scale Used ED Stated       Physical Exam   Constitutional: He is oriented to person, place, and time. Vital signs are normal. He appears well-developed and well-nourished. No distress.   Cachectic    HENT:   Head: Normocephalic.   Nose: Nose normal.   Mouth/Throat: Oropharynx is clear and moist. No oropharyngeal exudate.   Eyes: Conjunctivae and EOM are normal. Pupils are equal, round, and reactive to light. No scleral icterus.   Neck: Trachea normal and normal range of motion. Neck supple. No JVD present. No muscular tenderness present. No edema present.   Cardiovascular: Normal rate, regular rhythm, normal heart sounds and intact distal pulses.    No murmur heard.  Pulmonary/Chest: Accessory muscle usage present. No stridor. No respiratory distress. He has decreased breath sounds (throughout). He has wheezes (expiratory, thoughout). He has no rales. He exhibits no tenderness.   Abdominal: Soft. Bowel sounds are normal. He exhibits no mass. There is no tenderness. There is no guarding.   Musculoskeletal: Normal range of motion. He exhibits no edema or tenderness.   Neurological: He is alert and oriented to person, place, and time. He has normal strength. No cranial nerve deficit or sensory deficit.   Skin: Skin is warm and dry. No bruising and no rash noted. He is not diaphoretic. No erythema. No pallor.   Psychiatric: He has a normal mood and affect. His behavior is normal.   Nursing note and  vitals reviewed.      ED Course     Procedures    Lab Results     Results for orders placed or performed during the hospital encounter of 06/17/18   Basic Metabolic Panel   Result Value Ref Range    Sodium 140 135 - 145 mmol/L    Potassium 4.4 3.4 - 5.1 mmol/L    Chloride 104 98 - 107 mmol/L    Carbon Dioxide 27 21 - 32 mmol/L    Anion Gap 13 10 - 20 mmol/L    Glucose 90 65 - 99 mg/dL    BUN 11 6 - 20 mg/dL    Creatinine 0.86 0.67 - 1.17 mg/dL    GFR Estimate,  >90     GFR Estimate, Non African American 82     BUN/Creatinine Ratio 13 7 - 25    CALCIUM 8.6 8.4 - 10.2 mg/dL   CBC & Auto Differential   Result Value Ref Range    WBC 5.6 4.2 - 11.0 K/mcL    RBC 4.32 (L) 4.50 - 5.90 mil/mcL    HGB 14.1 13.0 - 17.0 g/dL    HCT 43.0 39.0 - 51.0 %    MCV 99.5 78.0 - 100.0 fl    MCH 32.6 26.0 - 34.0 pg    MCHC 32.8 32.0 - 36.5 g/dL    RDW-CV 13.2 11.0 - 15.0 %     140 - 450 K/mcL    NRBC 0 0 /100 WBC    DIFF TYPE AUTOMATED DIFFERENTIAL     Neutrophil 66 %    LYMPH 20 %    MONO 10 %    EOSIN 2 %    BASO 1 %    PERCENT IMMATURE GRANULOCYTES 1 %    Absolute Neutrophil 3.8 1.8 - 7.7 K/mcL    Absolute Lymph 1.1 1.0 - 4.0 K/mcL    Absolute Mono 0.6 0.3 - 0.9 K/mcL    Absolute Eos 0.1 0.1 - 0.5 K/mcL    Absolute Baso 0.0 0.0 - 0.3 K/mcL    Absolute Immature Granulocytes 0.1 0 - 0.2 K/mcl       EKG Results     EKG Interpretation  Rate: 102  Rhythm: sinus tachycardia   Abnormality: Right axis deviation; no old EKG's for comparison.     EKG interpreted by ED physician    Radiology Results     Imaging Results          XR CHEST AP OR PA - PORTABLE (Final result)  Result time 06/17/18 10:59:30    Final result                 Impression:    Impression:    No acute cardiopulmonary process.                Narrative:    Exam: XR CHEST AP OR PA    Indication: sob    Comparison: May 14, 2015    Findings:     Monitoring leads overlying the chest.    Probable bibasilar atelectasis.     There are no pleural effusions, focal  consolidations, or pneumothoraces.  The pulmonary vascularity is normal.    The cardiomediastinal silhouette is unremarkable.     No acute osseous findings.                                      ED Medication Orders     Start Ordered     Status Ordering Provider    06/17/18 1118 06/17/18 1118  albuterol (VENTOLIN) nebulizer 15 mg  (albuterol (VENTOLIN) nebulizer 2.5 mg/3 mL)  ONCE      Last MAR action:  Given KASEY PEDERSEN    06/17/18 1008 06/17/18 1007  sodium chloride (NORMAL SALINE) 0.9 % bolus 500 mL  ONCE      Last MAR action:  Completed KASEY PEDERSEN    06/17/18 1008 06/17/18 1007  methylPREDNISolone (solu-MEDROL) PF injection 125 mg  ONCE      Last MAR action:  Given KASEY PEDERSEN    06/17/18 1007 06/17/18 1007  albuterol (VENTOLIN) nebulizer 2.5 mg  (albuterol (VENTOLIN) nebulizer 2.5 mg/3 mL)  EVERY 4 HOURS RESPIRATORY PRN      Last MAR action:  Given KASEY PEDERSEN          [facility]     Vitals  Vitals:    06/17/18 1245 06/17/18 1300 06/17/18 1315 06/17/18 1330   BP:  111/55  106/55   Pulse: 122 119 120 112   Resp: 28 22 23 22   Temp:       TempSrc:       SpO2: 96% 94% 95% 94%   Weight:       Height:           ED Course  9:58 AM I reviewed the patient's medications, allergies, and past medical and surgical history in Epic. He has a history of COPD.     9:59 AM Initial Impression: The patient presents to the ED with cough and SOB. They have a history of COPD. I informed the patient that I will order a breathing treatment for him, as well as a steroid to help with his breathing. I will also order an EKG, CXR, and blood work. They understand and agree with the plan of care. Any questions have been answered.     11:06 AM The patient appears to be resting comfortably in bed. He states that he is feeling slightly better. On repeat exam, his lungs sound better. I will get another breathing treatment for him. They understand and agree with the plan of care. Any questions have been answered.    12:00 PM The patient  appears to be resting comfortably in bed. I reexamine the pt and find coarse breath sounds and good air movement with no wheezes. They understand and agree with the plan of care. Any questions have been answered.    12:44 PM The patient appears to be resting comfortably in bed. He states that his breathing feels much better. We discuss that I would like to keep him a little while longer to make sure that his heart rate comes down. I advise the pt to stay inside and rest. I encourage him to follow up with his PCP within the week. I explain that I will prescribe him steroids and an antibiotic. They understand and agree with the plan of care. Any questions have been answered.       [facility]  Critical Care time spent on this patient outside of billable procedures:  None    Clinical Impression  ED Diagnosis        Final diagnosis    Chronic obstructive pulmonary disease with acute exacerbation (CMS/[facility])             Follow Up:  Your doctor    Schedule an appointment as soon as possible for a visit         Discharge Medication List as of 6/17/2018 12:56 PM      START taking these medications    Details   doxycycline hyclate (VIBRA-TABS) 100 MG tablet Take 1 tablet by mouth 2 times daily.Normal, Disp-14 tablet, R-0      !! predniSONE (DELTASONE) 20 MG tablet Take 2 tablets by mouth daily.Normal, Disp-8 tablet, R-0       !! - Potential duplicate medications found. Please discuss with provider.          Pt is discharged to home/self care in stable condition.       I have reviewed the information recorded by the scribe for accuracy and agree with its contents.    ____________________________________________________________________    Gaby Garcia acting as a scribe for Dr. Marianela Agrawal  Dictation # [id number]                Marianela Agrawal MD  06/17/18 8932     No

## 2022-01-05 NOTE — CHART NOTE - NSCHARTNOTEFT_GEN_A_CORE
Screening Medical Evaluation  Patient Admitted from: [facility] ED    ZH admitting diagnosis: Recurrent major depressive disorder    PAST MEDICAL & SURGICAL HISTORY:  No pertinent past medical history    No significant past surgical history          Allergies    No Known Allergies    Intolerances        Social History:     FAMILY HISTORY:      MEDICATIONS  (STANDING):  risperiDONE   Tablet 0.5 milliGRAM(s) Oral at bedtime    MEDICATIONS  (PRN):  acetaminophen     Tablet .. 650 milliGRAM(s) Oral every 6 hours PRN Mild Pain (1 - 3), Moderate Pain (4 - 6)  aluminum hydroxide/magnesium hydroxide/simethicone Suspension 30 milliLiter(s) Oral every 6 hours PRN Dyspepsia  diphenhydrAMINE 25 milliGRAM(s) Oral every 6 hours PRN agitation, EPS prophylaxis  diphenhydrAMINE Injectable 25 milliGRAM(s) IntraMuscular once PRN agitation, EPS prophylaxis  haloperidol     Tablet 2 milliGRAM(s) Oral every 6 hours PRN agitation  haloperidol    Injectable 2 milliGRAM(s) IntraMuscular once PRN severe agitation  LORazepam     Tablet 1 milliGRAM(s) Oral every 6 hours PRN anxiety  LORazepam   Injectable 1 milliGRAM(s) IntraMuscular once PRN Agitation  magnesium hydroxide Suspension 30 milliLiter(s) Oral daily PRN Constipation      Vital Signs Last 24 Hrs  T(C): 36.9 (2022 08:51), Max: 36.9 (2022 07:14)  T(F): 98.5 (2022 08:51), Max: 98.5 (2022 07:14)  HR: 100 (2022 08:51) (93 - 100)  BP: 114/70 (2022 07:14) (114/70 - 114/70)  BP(mean): --  RR: 16 (2022 08:51) (16 - 18)  SpO2: 100% (2022 08:51) (98% - 100%)  CAPILLARY BLOOD GLUCOSE            PHYSICAL EXAM:  GENERAL: NAD, well-developed  HEAD:  Atraumatic, Normocephalic  EYES: EOMI, PERRLA, conjunctiva and sclera clear  NECK: Supple, No JVD  CHEST/LUNG: Clear to auscultation bilaterally; No wheeze  HEART: Regular rate and rhythm; No murmurs, rubs, or gallops  ABDOMEN: Soft, Nontender, Nondistended; Bowel sounds present  EXTREMITIES:  2+ Peripheral Pulses, No cyanosis, or edema  PSYCH: AAOx3  NEUROLOGY: non-focal  SKIN: No rashes or lesions    LABS:                        11.9   10.88 )-----------( 284      ( 2022 17:36 )             37.9         138  |  103  |  13  ----------------------------<  144<H>  3.7   |  22  |  0.60    Ca    9.2      2022 17:36    TPro  7.0  /  Alb  4.8  /  TBili  0.7  /  DBili  x   /  AST  13  /  ALT  13  /  AlkPhos  91            Urinalysis Basic - ( 2022 20:35 )    Color: Light Yellow / Appearance: Clear / S.008 / pH: x  Gluc: x / Ketone: Negative  / Bili: Negative / Urobili: Negative   Blood: x / Protein: Negative / Nitrite: Negative   Leuk Esterase: Negative / RBC: x / WBC x   Sq Epi: x / Non Sq Epi: x / Bacteria: x        RADIOLOGY & ADDITIONAL TESTS:    Assessment and Plan:  21 year old female presenting today from [facility] ED to [facility] with admitting diagnosis of Recurrent major depressive disorder   with no pertinent PMH. Denies any fever, chills, headache, chest pain, SOB, abdominal pain, N/V/D/C, dysuria. Physical exam unremarkable.  1) Recurrent major depressive disorder: Follow care plan as per primary team.

## 2022-01-05 NOTE — BH SOCIAL WORK INITIAL PSYCHOSOCIAL EVALUATION - NSBHCHILDBEHAVIOR_PSY_ALL_CORE
Mother had Schizophrenia and episodes of acute psychosis during pt's childhood. 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

## 2022-01-05 NOTE — DIETITIAN INITIAL EVALUATION ADULT. - OTHER INFO
Pt admitted to [facility] for Depression. States appetite/po intake has improved since admission. No GI distress noted. Food preferences taken and implemented.

## 2022-01-05 NOTE — BH INPATIENT PSYCHIATRY PROGRESS NOTE - PRN MEDS
MEDICATIONS  (PRN):  acetaminophen     Tablet .. 650 milliGRAM(s) Oral every 6 hours PRN Mild Pain (1 - 3), Moderate Pain (4 - 6)  aluminum hydroxide/magnesium hydroxide/simethicone Suspension 30 milliLiter(s) Oral every 6 hours PRN Dyspepsia  diphenhydrAMINE 25 milliGRAM(s) Oral every 6 hours PRN agitation, EPS prophylaxis  diphenhydrAMINE Injectable 25 milliGRAM(s) IntraMuscular once PRN agitation, EPS prophylaxis  haloperidol     Tablet 5 milliGRAM(s) Oral every 6 hours PRN agitation  haloperidol    Injectable 2 milliGRAM(s) IntraMuscular once PRN severe agitation  LORazepam     Tablet 2 milliGRAM(s) Oral every 6 hours PRN anxiety  LORazepam   Injectable 1 milliGRAM(s) IntraMuscular once PRN Agitation  magnesium hydroxide Suspension 30 milliLiter(s) Oral daily PRN Constipation

## 2022-01-05 NOTE — BH SOCIAL WORK INITIAL PSYCHOSOCIAL EVALUATION - NSCMSPTSTRENGTHS_PSY_ALL_CORE
Expressive of emotions/Future/goal oriented/Highly motivated for treatment/Intact family/Interpersonal skills/Motivated/Physically healthy/Strong support system/Supportive family

## 2022-01-05 NOTE — BH INPATIENT PSYCHIATRY PROGRESS NOTE - NSBHFUPINTERVALHXFT_PSY_A_CORE
f/up psychotic, care discussed w/ tx team, VSS. Patient continued to perseverate about making a mistake at work, reported that she felt that supervisor was telling her to hang a piggyback while an antibiotic was running, which was erroneous thing to do. Patient reported passive SI, no plan or intent. Reported AH, voices telling her to "sleep, don't sleep and jump up and down";  patient reported feeling paranoid. Patient also reported feeling guilt related to hurting a patient. Patient receiving multiple prns including haldol and ativan this morning. Patient also reported that she bit herself in frustration and feeling overwhelmed. spoke to father--discussed tx plan. Patient allowed writer to examine her left foot today, appeared bruised, slight pain on palpation, reported it was swollen before. Patient with slight pain on movement. will postpone xray due to safety concerns. Patient requested court hearing for discharge on 1/4 and patient is not stable for discharged due to delusions, AH and suicidal ideation.

## 2022-01-05 NOTE — BH INPATIENT PSYCHIATRY PROGRESS NOTE - NSBHCHARTREVIEWVS_PSY_A_CORE FT
Vital Signs Last 24 Hrs  T(C): 36.8 (01-05-22 @ 08:15), Max: 36.8 (01-05-22 @ 08:15)  T(F): 98.2 (01-05-22 @ 08:15), Max: 98.2 (01-05-22 @ 08:15)  HR: --  BP: --  BP(mean): --  RR: 17 (01-05-22 @ 08:15) (17 - 17)  SpO2: --    Orthostatic VS  01-05-22 @ 08:15  Lying BP: --/-- HR: --  Sitting BP: 110/67 HR: 104  Standing BP: 113/67 HR: 111  Site: --  Mode: --  Orthostatic VS  01-04-22 @ 08:51  Lying BP: --/-- HR: --  Sitting BP: 128/88 HR: 96  Standing BP: 120/92 HR: 84  Site: --  Mode: --

## 2022-01-05 NOTE — BH INPATIENT PSYCHIATRY PROGRESS NOTE - CURRENT MEDICATION
MEDICATIONS  (STANDING):  clonazePAM  Tablet 0.5 milliGRAM(s) Oral two times a day  risperiDONE   Tablet 1 milliGRAM(s) Oral at bedtime    MEDICATIONS  (PRN):  acetaminophen     Tablet .. 650 milliGRAM(s) Oral every 6 hours PRN Mild Pain (1 - 3), Moderate Pain (4 - 6)  aluminum hydroxide/magnesium hydroxide/simethicone Suspension 30 milliLiter(s) Oral every 6 hours PRN Dyspepsia  diphenhydrAMINE 25 milliGRAM(s) Oral every 6 hours PRN agitation, EPS prophylaxis  diphenhydrAMINE Injectable 25 milliGRAM(s) IntraMuscular once PRN agitation, EPS prophylaxis  haloperidol     Tablet 5 milliGRAM(s) Oral every 6 hours PRN agitation  haloperidol    Injectable 2 milliGRAM(s) IntraMuscular once PRN severe agitation  LORazepam     Tablet 2 milliGRAM(s) Oral every 6 hours PRN anxiety  LORazepam   Injectable 1 milliGRAM(s) IntraMuscular once PRN Agitation  magnesium hydroxide Suspension 30 milliLiter(s) Oral daily PRN Constipation

## 2022-01-06 PROCEDURE — 99232 SBSQ HOSP IP/OBS MODERATE 35: CPT

## 2022-01-06 RX ORDER — LANOLIN ALCOHOL/MO/W.PET/CERES
3 CREAM (GRAM) TOPICAL AT BEDTIME
Refills: 0 | Status: DISCONTINUED | OUTPATIENT
Start: 2022-01-06 | End: 2022-01-19

## 2022-01-06 RX ORDER — RISPERIDONE 4 MG/1
2.5 TABLET ORAL AT BEDTIME
Refills: 0 | Status: DISCONTINUED | OUTPATIENT
Start: 2022-01-06 | End: 2022-01-13

## 2022-01-06 RX ORDER — CLONAZEPAM 1 MG
0.5 TABLET ORAL THREE TIMES A DAY
Refills: 0 | Status: DISCONTINUED | OUTPATIENT
Start: 2022-01-06 | End: 2022-01-07

## 2022-01-06 RX ORDER — LANOLIN ALCOHOL/MO/W.PET/CERES
3 CREAM (GRAM) TOPICAL ONCE
Refills: 0 | Status: COMPLETED | OUTPATIENT
Start: 2022-01-06 | End: 2022-01-06

## 2022-01-06 RX ADMIN — Medication 25 MILLIGRAM(S): at 18:37

## 2022-01-06 RX ADMIN — RISPERIDONE 2 MILLIGRAM(S): 4 TABLET ORAL at 21:00

## 2022-01-06 RX ADMIN — Medication 2 MILLIGRAM(S): at 18:37

## 2022-01-06 RX ADMIN — Medication 3 MILLIGRAM(S): at 21:00

## 2022-01-06 RX ADMIN — Medication 3 MILLIGRAM(S): at 03:13

## 2022-01-06 RX ADMIN — Medication 0.5 MILLIGRAM(S): at 09:14

## 2022-01-06 RX ADMIN — Medication 0.5 MILLIGRAM(S): at 16:23

## 2022-01-06 RX ADMIN — HALOPERIDOL DECANOATE 5 MILLIGRAM(S): 100 INJECTION INTRAMUSCULAR at 18:37

## 2022-01-06 RX ADMIN — Medication 2 MILLIGRAM(S): at 11:17

## 2022-01-06 RX ADMIN — HALOPERIDOL DECANOATE 5 MILLIGRAM(S): 100 INJECTION INTRAMUSCULAR at 11:17

## 2022-01-06 RX ADMIN — Medication 25 MILLIGRAM(S): at 11:17

## 2022-01-06 RX ADMIN — Medication 0.5 MILLIGRAM(S): at 21:00

## 2022-01-06 NOTE — CHART NOTE - NSCHARTNOTEFT_GEN_A_CORE
Examined patient L ankle, no visible swelling or bruising. Observed patient ambulate w/o difficulty, she is able to bear weight on L ankle, low concern for fracture. Pain control w/ tylenol 975 mg q6 hrs as needed and can apply ice as patient tolerates. No need to pursue xray at this time.

## 2022-01-06 NOTE — BH INPATIENT PSYCHIATRY PROGRESS NOTE - PRN MEDS
MEDICATIONS  (PRN):  acetaminophen     Tablet .. 650 milliGRAM(s) Oral every 6 hours PRN Mild Pain (1 - 3), Moderate Pain (4 - 6)  aluminum hydroxide/magnesium hydroxide/simethicone Suspension 30 milliLiter(s) Oral every 6 hours PRN Dyspepsia  diphenhydrAMINE 25 milliGRAM(s) Oral every 6 hours PRN agitation, EPS prophylaxis  diphenhydrAMINE Injectable 25 milliGRAM(s) IntraMuscular once PRN agitation, EPS prophylaxis  haloperidol     Tablet 5 milliGRAM(s) Oral every 6 hours PRN agitation  haloperidol    Injectable 2 milliGRAM(s) IntraMuscular once PRN severe agitation  LORazepam     Tablet 2 milliGRAM(s) Oral every 6 hours PRN anxiety  LORazepam   Injectable 1 milliGRAM(s) IntraMuscular once PRN Agitation  magnesium hydroxide Suspension 30 milliLiter(s) Oral daily PRN Constipation  melatonin. 3 milliGRAM(s) Oral at bedtime PRN Insomnia

## 2022-01-06 NOTE — BH INPATIENT PSYCHIATRY PROGRESS NOTE - NSBHASSESSSUMMFT_PSY_ALL_CORE
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 by EMS activated by her family due to a suicide attempt. 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 She presents with delusions of guilt (i.e., that she harmed someone in the hospital and should be arrested), and numerous recent parasuicidal behaviors versus aborted suicide attempts. She has voiced that she wanted to kill herself to her parents and was ambivalent about suicidality today after engaging in suicidal behavior yesterday.  Patient's presentation is concerning for with first episode psychosis, though the differential is broad.  Patient is at elevated risk of harm to self at this time due to psychosis and suicidal ideation and she requires inpatient hospitalization on a psychiatric unit for safety and stabilization and diagnostic clarity.     on assessment today, patient continues to have paranoid ideations, is distressed at times, needs continuous redirection,  suicidal ideation, no plan or intent, with SIB, and AH, non-command to harm self/ others. Patient perseverating on discharge.     1. admit to inpatient psychiatry  2. routine care, no need for CO at this time, patient denied suicidal ideation  3. increase Risperdal  2 mg hs for psychosis--discussed indications, SE, increase Klonopin 0.5mg tid and c/w prns to Haldol 5mg oral and ativan 2mg prn  4. tx will include milieu, individual therapy

## 2022-01-06 NOTE — BH INPATIENT PSYCHIATRY PROGRESS NOTE - NSBHCHARTREVIEWVS_PSY_A_CORE FT
Vital Signs Last 24 Hrs  T(C): 36.3 (01-06-22 @ 08:37), Max: 36.3 (01-06-22 @ 08:37)  T(F): 97.3 (01-06-22 @ 08:37), Max: 97.3 (01-06-22 @ 08:37)  HR: 78 (01-06-22 @ 08:37) (78 - 78)  BP: 101/60 (01-06-22 @ 08:37) (101/60 - 101/60)  BP(mean): --  RR: 16 (01-06-22 @ 08:37) (16 - 16)  SpO2: --    Orthostatic VS  01-05-22 @ 08:15  Lying BP: --/-- HR: --  Sitting BP: 110/67 HR: 104  Standing BP: 113/67 HR: 111  Site: --  Mode: --

## 2022-01-06 NOTE — BH INPATIENT PSYCHIATRY PROGRESS NOTE - NSBHFUPINTERVALHXFT_PSY_A_CORE
f/up psychosis, care discussed with tx team, JANIS. Patient reported feeling guilt and continues to perseverate about making an error at work. Patient also perseverating and tearful, wants to be discharged home. Patient reported SI, with no plan or intent. Patient admitted to , voices telling her to "run" and that she is a "bad person". Patient denied SE from Risperdal. no td, eps or tremors. Patient reported poor sleep and fair appetite. Patient instructed to use headphones to distract from . Denied CAH to hurt self/ others.

## 2022-01-07 PROCEDURE — 99232 SBSQ HOSP IP/OBS MODERATE 35: CPT

## 2022-01-07 RX ORDER — CLONAZEPAM 1 MG
0.5 TABLET ORAL
Refills: 0 | Status: DISCONTINUED | OUTPATIENT
Start: 2022-01-07 | End: 2022-01-11

## 2022-01-07 RX ADMIN — Medication 0.5 MILLIGRAM(S): at 20:47

## 2022-01-07 RX ADMIN — RISPERIDONE 2 MILLIGRAM(S): 4 TABLET ORAL at 20:46

## 2022-01-07 RX ADMIN — Medication 0.5 MILLIGRAM(S): at 09:04

## 2022-01-07 RX ADMIN — Medication 2 MILLIGRAM(S): at 17:53

## 2022-01-07 NOTE — BH INPATIENT PSYCHIATRY PROGRESS NOTE - NSBHFUPINTERVALHXFT_PSY_A_CORE
f/up psychosis, care discussed with tx team, VSS patient denied SI, intent or plan. Reported AH, voices telling her to "rot in hell". denied commands to hurt self/ others. Patient continues to perseverate on medication errors that she has made at work and that this might be happening to her now. patient with paranoid delusions. Patient tearful, wants to see her family. Patient reported sleeping better and with fair appetite. Patient reported feeling slowed down and tired from the medications. no eps, tremors or td observed/ reported.

## 2022-01-07 NOTE — BH INPATIENT PSYCHIATRY PROGRESS NOTE - CURRENT MEDICATION
MEDICATIONS  (STANDING):  clonazePAM  Tablet 0.5 milliGRAM(s) Oral two times a day  risperiDONE  Disintegrating Tablet 2 milliGRAM(s) Oral at bedtime    MEDICATIONS  (PRN):  acetaminophen     Tablet .. 650 milliGRAM(s) Oral every 6 hours PRN Mild Pain (1 - 3), Moderate Pain (4 - 6)  aluminum hydroxide/magnesium hydroxide/simethicone Suspension 30 milliLiter(s) Oral every 6 hours PRN Dyspepsia  diphenhydrAMINE 25 milliGRAM(s) Oral every 6 hours PRN agitation, EPS prophylaxis  diphenhydrAMINE Injectable 25 milliGRAM(s) IntraMuscular once PRN agitation, EPS prophylaxis  haloperidol     Tablet 5 milliGRAM(s) Oral every 6 hours PRN agitation  haloperidol    Injectable 2 milliGRAM(s) IntraMuscular once PRN severe agitation  LORazepam     Tablet 2 milliGRAM(s) Oral every 6 hours PRN anxiety  LORazepam   Injectable 1 milliGRAM(s) IntraMuscular once PRN Agitation  magnesium hydroxide Suspension 30 milliLiter(s) Oral daily PRN Constipation  melatonin. 3 milliGRAM(s) Oral at bedtime PRN Insomnia

## 2022-01-07 NOTE — BH INPATIENT PSYCHIATRY PROGRESS NOTE - NSBHCHARTREVIEWVS_PSY_A_CORE FT
Vital Signs Last 24 Hrs  T(C): 36.5 (01-07-22 @ 09:24), Max: 36.5 (01-07-22 @ 09:24)  T(F): 97.7 (01-07-22 @ 09:24), Max: 97.7 (01-07-22 @ 09:24)  HR: 95 (01-07-22 @ 09:24) (95 - 95)  BP: 114/69 (01-07-22 @ 09:24) (114/69 - 114/69)  BP(mean): --  RR: 16 (01-07-22 @ 09:24) (16 - 16)  SpO2: 100% (01-07-22 @ 09:24) (100% - 100%)    Orthostatic VS  01-07-22 @ 08:22  Lying BP: --/-- HR: --  Sitting BP: 110/67 HR: 94  Standing BP: 121/86 HR: 120  Site: upper left arm  Mode: electronic

## 2022-01-07 NOTE — BH INPATIENT PSYCHIATRY PROGRESS NOTE - NSBHASSESSSUMMFT_PSY_ALL_CORE
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 by EMS activated by her family due to a suicide attempt. 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 She presents with delusions of guilt (i.e., that she harmed someone in the hospital and should be arrested), and numerous recent parasuicidal behaviors versus aborted suicide attempts. She has voiced that she wanted to kill herself to her parents and was ambivalent about suicidality today after engaging in suicidal behavior yesterday.  Patient's presentation is concerning for with first episode psychosis, though the differential is broad.  Patient is at elevated risk of harm to self at this time due to psychosis and suicidal ideation and she requires inpatient hospitalization on a psychiatric unit for safety and stabilization and diagnostic clarity.     on assessment today, patient continues to have paranoid ideations, referential,  denied  suicidal ideation, no plan or intent,  and AH, non-command to harm self/ others. Patient perseverating on discharge.     1. admit to inpatient psychiatry  2. routine care, no need for CO at this time, patient denied suicidal ideation  3. c/w  Risperdal  2 mg hs for psychosis--discussed indications, SE, decrease Klonopin 0.5mg bid and c/w prns to Haldol 5mg oral and ativan 2mg prn  4. tx will include milieu, individual therapy

## 2022-01-08 PROCEDURE — 99231 SBSQ HOSP IP/OBS SF/LOW 25: CPT

## 2022-01-08 RX ADMIN — Medication 0.5 MILLIGRAM(S): at 09:02

## 2022-01-08 RX ADMIN — RISPERIDONE 2 MILLIGRAM(S): 4 TABLET ORAL at 20:32

## 2022-01-08 RX ADMIN — Medication 0.5 MILLIGRAM(S): at 20:32

## 2022-01-08 RX ADMIN — Medication 3 MILLIGRAM(S): at 20:32

## 2022-01-08 NOTE — BH INPATIENT PSYCHIATRY PROGRESS NOTE - MSE UNSTRUCTURED FT
Dressed appropriately.  Good hygiene and grooming.  Calm and cooperative.  No psychomotor slowing or activation noted.  No abnormal movements noted.  Fairly well related, good eye contact.  Speech regular rate, normal prosody.  Mood is "ok," affect pleasant, full range, reactive.  Linear TP, fair associations.  TC: Denies SIIP or HIIP.  Insight fair, judgment fair on interview, attention fair, language fluent, gait intact.

## 2022-01-08 NOTE — BH INPATIENT PSYCHIATRY PROGRESS NOTE - NSBHFUPINTERVALHXFT_PSY_A_CORE
Received 1 Ativan PRN yesterday.  Pt reports that she is sleeping and eating ok.  No thoughts of harming self anymore.

## 2022-01-08 NOTE — BH INPATIENT PSYCHIATRY PROGRESS NOTE - NSBHCHARTREVIEWVS_PSY_A_CORE FT
Vital Signs Last 24 Hrs  T(C): 36.6 (01-08-22 @ 18:40), Max: 36.6 (01-08-22 @ 18:40)  T(F): 97.9 (01-08-22 @ 18:40), Max: 97.9 (01-08-22 @ 18:40)  HR: --  BP: --  BP(mean): --  RR: 20 (01-08-22 @ 08:37) (20 - 20)  SpO2: --    Orthostatic VS  01-08-22 @ 08:37  Lying BP: --/-- HR: --  Sitting BP: 116/67 HR: 90  Standing BP: 121/69 HR: 99  Site: --  Mode: --  Orthostatic VS  01-07-22 @ 08:22  Lying BP: --/-- HR: --  Sitting BP: 110/67 HR: 94  Standing BP: 121/86 HR: 120  Site: upper left arm  Mode: electronic

## 2022-01-08 NOTE — BH INPATIENT PSYCHIATRY PROGRESS NOTE - NSBHASSESSSUMMFT_PSY_ALL_CORE
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 She presents with delusions of guilt (i.e., that she harmed someone in the hospital and should be arrested), and numerous recent parasuicidal behaviors versus aborted suicide attempts. She has voiced that she wanted to kill herself to her parents and was ambivalent about suicidality today after engaging in suicidal behavior yesterday.  Patient's presentation is concerning for with first episode psychosis, though the differential is broad.  Patient is at elevated risk of harm to self at this time due to psychosis and suicidal ideation and she requires inpatient hospitalization on a psychiatric unit for safety and stabilization and diagnostic clarity.     Behaviorally in control, emotionally regulated, continue plan below.    1. admit to inpatient psychiatry  2. routine care, no need for CO at this time, patient denied suicidal ideation  3. c/w  Risperdal  2 mg hs for psychosis--discussed indications, SE, decrease Klonopin 0.5mg bid and c/w prns to Haldol 5mg oral and ativan 2mg prn  4. tx will include milieu, individual therapy

## 2022-01-09 LAB — SARS-COV-2 RNA SPEC QL NAA+PROBE: SIGNIFICANT CHANGE UP

## 2022-01-09 RX ADMIN — Medication 3 MILLIGRAM(S): at 21:57

## 2022-01-09 RX ADMIN — Medication 0.5 MILLIGRAM(S): at 09:00

## 2022-01-09 RX ADMIN — Medication 650 MILLIGRAM(S): at 17:56

## 2022-01-09 RX ADMIN — Medication 0.5 MILLIGRAM(S): at 21:56

## 2022-01-09 RX ADMIN — RISPERIDONE 2 MILLIGRAM(S): 4 TABLET ORAL at 21:57

## 2022-01-10 PROCEDURE — 99232 SBSQ HOSP IP/OBS MODERATE 35: CPT

## 2022-01-10 RX ADMIN — Medication 0.5 MILLIGRAM(S): at 21:09

## 2022-01-10 RX ADMIN — Medication 0.5 MILLIGRAM(S): at 09:33

## 2022-01-10 RX ADMIN — RISPERIDONE 2.5 MILLIGRAM(S): 4 TABLET ORAL at 21:09

## 2022-01-10 NOTE — BH INPATIENT PSYCHIATRY PROGRESS NOTE - CURRENT MEDICATION
MEDICATIONS  (STANDING):  clonazePAM  Tablet 0.5 milliGRAM(s) Oral two times a day  risperiDONE  Disintegrating Tablet 2.5 milliGRAM(s) Oral at bedtime    MEDICATIONS  (PRN):  acetaminophen     Tablet .. 650 milliGRAM(s) Oral every 6 hours PRN Mild Pain (1 - 3), Moderate Pain (4 - 6)  aluminum hydroxide/magnesium hydroxide/simethicone Suspension 30 milliLiter(s) Oral every 6 hours PRN Dyspepsia  diphenhydrAMINE 25 milliGRAM(s) Oral every 6 hours PRN agitation, EPS prophylaxis  diphenhydrAMINE Injectable 25 milliGRAM(s) IntraMuscular once PRN agitation, EPS prophylaxis  haloperidol     Tablet 5 milliGRAM(s) Oral every 6 hours PRN agitation  haloperidol    Injectable 2 milliGRAM(s) IntraMuscular once PRN severe agitation  LORazepam     Tablet 2 milliGRAM(s) Oral every 6 hours PRN anxiety  LORazepam   Injectable 1 milliGRAM(s) IntraMuscular once PRN Agitation  magnesium hydroxide Suspension 30 milliLiter(s) Oral daily PRN Constipation  melatonin. 3 milliGRAM(s) Oral at bedtime PRN Insomnia

## 2022-01-10 NOTE — BH INPATIENT PSYCHIATRY PROGRESS NOTE - NSBHFUPINTERVALHXFT_PSY_A_CORE
f/up psychosis, care discussed w/ tx team, VSS. Patient reported feeling "calm, relaxed", reported using coping skills such as music. Patient reported AH, noncommand calling her names. Patient also with good insight: When asked about making errors at work, patient stated that she was "delusional and paranoid", acknowledged that she was on orientation. Patient not focused on errors at this time. patient denied SI and SIB. agreed to risperdal increase. no td, eps or tremors observed/ reported.

## 2022-01-10 NOTE — BH INPATIENT PSYCHIATRY PROGRESS NOTE - NSBHASSESSSUMMFT_PSY_ALL_CORE
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 She presents with delusions of guilt (i.e., that she harmed someone in the hospital and should be arrested), and numerous recent parasuicidal behaviors versus aborted suicide attempts. She has voiced that she wanted to kill herself to her parents and was ambivalent about suicidality today after engaging in suicidal behavior yesterday.  Patient's presentation is concerning for with first episode psychosis, though the differential is broad.  Patient is at elevated risk of harm to self at this time due to psychosis and suicidal ideation and she requires inpatient hospitalization on a psychiatric unit for safety and stabilization and diagnostic clarity.     Patient was calmer, with AH, decreasing, noncommand and less paranoid, with improved insight and judgment.     1. admit to inpatient psychiatry  2. routine care, no need for CO at this time, patient denied suicidal ideation  3. increase  Risperdal  2.5 mg hs for psychosis--discussed indications, SE, c/w Klonopin 0.5mg bid and c/w prns to Haldol 5mg oral and ativan 2mg prn  4. tx will include milieu, individual therapy  5. disposition: SW to explore ETP

## 2022-01-10 NOTE — BH INPATIENT PSYCHIATRY PROGRESS NOTE - NSBHCHARTREVIEWVS_PSY_A_CORE FT
Vital Signs Last 24 Hrs  T(C): 36.4 (01-10-22 @ 08:48), Max: 36.6 (01-09-22 @ 19:54)  T(F): 97.5 (01-10-22 @ 08:48), Max: 97.9 (01-09-22 @ 19:54)  HR: --  BP: --  BP(mean): --  RR: 17 (01-10-22 @ 08:48) (17 - 17)  SpO2: --    Orthostatic VS  01-10-22 @ 08:48  Lying BP: --/-- HR: --  Sitting BP: 103/57 HR: 95  Standing BP: 104/64 HR: 127  Site: upper left arm  Mode: electronic  Orthostatic VS  01-09-22 @ 09:52  Lying BP: --/-- HR: --  Sitting BP: 97/59 HR: 114  Standing BP: 107/53 HR: 124  Site: --  Mode: --

## 2022-01-11 RX ORDER — CLONAZEPAM 1 MG
0.5 TABLET ORAL
Refills: 0 | Status: DISCONTINUED | OUTPATIENT
Start: 2022-01-11 | End: 2022-01-12

## 2022-01-11 RX ADMIN — Medication 0.5 MILLIGRAM(S): at 10:06

## 2022-01-11 RX ADMIN — Medication 3 MILLIGRAM(S): at 21:23

## 2022-01-11 RX ADMIN — RISPERIDONE 2.5 MILLIGRAM(S): 4 TABLET ORAL at 21:23

## 2022-01-11 RX ADMIN — Medication 0.5 MILLIGRAM(S): at 21:24

## 2022-01-11 NOTE — BH INPATIENT PSYCHIATRY PROGRESS NOTE - NSBHASSESSSUMMFT_PSY_ALL_CORE
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 She presents with delusions of guilt (i.e., that she harmed someone in the hospital and should be arrested), and numerous recent parasuicidal behaviors versus aborted suicide attempts. She has voiced that she wanted to kill herself to her parents and was ambivalent about suicidality today after engaging in suicidal behavior yesterday.  Patient's presentation is concerning for with first episode psychosis, though the differential is broad.  Patient is at elevated risk of harm to self at this time due to psychosis and suicidal ideation and she requires inpatient hospitalization on a psychiatric unit for safety and stabilization and diagnostic clarity.     Patient was calmer, delusions with less convictions, no AH. tolerating Risperdal.     1. admit to inpatient psychiatry  2. routine care, no need for CO at this time, patient denied suicidal ideation  3. c/w Risperdal  2.5 mg hs for psychosis--discussed indications, SE, c/w Klonopin 0.5mg bid and c/w prns to Haldol 5mg oral and ativan 2mg prn  4. tx will include milieu, individual therapy  5. disposition: SW to explore ETP

## 2022-01-11 NOTE — BH INPATIENT PSYCHIATRY PROGRESS NOTE - NSBHCHARTREVIEWVS_PSY_A_CORE FT
Vital Signs Last 24 Hrs  T(C): 36.3 (01-11-22 @ 08:38), Max: 36.3 (01-11-22 @ 08:38)  T(F): 97.4 (01-11-22 @ 08:38), Max: 97.4 (01-11-22 @ 08:38)  HR: --  BP: --  BP(mean): --  RR: 16 (01-11-22 @ 08:38) (16 - 16)  SpO2: --    Orthostatic VS  01-11-22 @ 08:38  Lying BP: --/-- HR: --  Sitting BP: 102/76 HR: 93  Standing BP: 111/70 HR: 102  Site: --  Mode: --  Orthostatic VS  01-10-22 @ 08:48  Lying BP: --/-- HR: --  Sitting BP: 103/57 HR: 95  Standing BP: 104/64 HR: 127  Site: upper left arm  Mode: electronic

## 2022-01-11 NOTE — BH INPATIENT PSYCHIATRY PROGRESS NOTE - NSBHFUPINTERVALHXFT_PSY_A_CORE
f/up psychosis, care discussed with tx team, JANIS. Patient reported that paranoid ideations are lessening and feels more organized. Patient reported that she still felt like she made errors at her job but with less conviction. Tolerating Risperdal. no eps, tremors or td reported/ observed. Reported sleeping well and with fair appetite.

## 2022-01-12 LAB — SARS-COV-2 RNA SPEC QL NAA+PROBE: SIGNIFICANT CHANGE UP

## 2022-01-12 PROCEDURE — 99232 SBSQ HOSP IP/OBS MODERATE 35: CPT

## 2022-01-12 RX ORDER — CLONAZEPAM 1 MG
0.5 TABLET ORAL DAILY
Refills: 0 | Status: DISCONTINUED | OUTPATIENT
Start: 2022-01-13 | End: 2022-01-14

## 2022-01-12 RX ADMIN — RISPERIDONE 2.5 MILLIGRAM(S): 4 TABLET ORAL at 21:13

## 2022-01-12 RX ADMIN — Medication 0.5 MILLIGRAM(S): at 08:09

## 2022-01-12 RX ADMIN — Medication 3 MILLIGRAM(S): at 22:46

## 2022-01-12 NOTE — BH INPATIENT PSYCHIATRY PROGRESS NOTE - NSBHASSESSSUMMFT_PSY_ALL_CORE
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 She presents with delusions of guilt (i.e., that she harmed someone in the hospital and should be arrested), and numerous recent parasuicidal behaviors versus aborted suicide attempts. She has voiced that she wanted to kill herself to her parents and was ambivalent about suicidality today after engaging in suicidal behavior yesterday.  Patient's presentation is concerning for with first episode psychosis, though the differential is broad.  Patient is at elevated risk of harm to self at this time due to psychosis and suicidal ideation and she requires inpatient hospitalization on a psychiatric unit for safety and stabilization and diagnostic clarity.     Patient was calmer, less paranoid, no AH. tolerating Risperdal.     1. admit to inpatient psychiatry  2. routine care, no need for CO at this time, patient denied suicidal ideation  3. c/w Risperdal  2.5 mg hs for psychosis--discussed indications, SE, decrease Klonopin 0.5mg hs and c/w prns to Haldol 5mg oral and ativan 2mg prn  4. tx will include milieu, individual therapy  5. disposition: SW to explore ETP

## 2022-01-12 NOTE — BH TREATMENT PLAN - NSTXSUICIDINTERPR_PSY_ALL_CORE
Over the next 7 days, Psychiatric Rehabilitation staff will utilize individual psychotherapy and psychoeducation to assist the patient in reaching her treatment goal.
Patient met her treatment goal evidenced by her ability to identify her parents as a protective factor, and journaling, exercise, listening to music, and establishing a routine as effective coping skills.

## 2022-01-12 NOTE — BH TREATMENT PLAN - NSTXPSYCHOGOAL_PSY_ALL_CORE
Will verbalize 1 strategy to successfully cope with psychotic symptoms
Will identify 2 coping skills that assist with focus on reality

## 2022-01-12 NOTE — BH TREATMENT PLAN - NSTXPLANTHERAPYSESSIONSFT_PSY_ALL_CORE
01-11-22  Type of therapy: Coping skills,Mindfulness,Psychoeducation,Self esteem,Stress management,Over the past 7 days, group programming was suspended due to COVID-related concerns. Patient was offered individual check-ins and activity sheets.   Type of session: Individual  Level of patient participation: Attentive,Engaged  Duration of participation: 15 minutes  Therapy conducted by: Psych rehab  Therapy Summary: Writer met with patient to assess progress of psychiatric rehabilitation goal over the past week. 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 Writer provided psychoeducation about cognitive distortions and encouraged patient to add flexibility to negative thinking. Patient was receptive.     Over the past seven days, patient was visible on the unit, accepted activity sheets provided by psychiatric rehabilitation staff, and exhibited positive interactions with peers. Patient is engaged in treatment with staff and medication compliant. Patient has good behavioral control, good ADLs and denied SI, HI, AH, and VH.

## 2022-01-12 NOTE — BH INPATIENT PSYCHIATRY PROGRESS NOTE - NSBHCHARTREVIEWVS_PSY_A_CORE FT
Vital Signs Last 24 Hrs  T(C): 35.9 (01-12-22 @ 08:48), Max: 36.3 (01-11-22 @ 19:45)  T(F): 96.7 (01-12-22 @ 08:48), Max: 97.3 (01-11-22 @ 19:45)  HR: --  BP: --  BP(mean): --  RR: --  SpO2: --    Orthostatic VS  01-12-22 @ 08:48  Lying BP: --/-- HR: --  Sitting BP: 112/64 HR: 92  Standing BP: 113/85 HR: 95  Site: --  Mode: --  Orthostatic VS  01-11-22 @ 08:38  Lying BP: --/-- HR: --  Sitting BP: 102/76 HR: 93  Standing BP: 111/70 HR: 102  Site: --  Mode: --

## 2022-01-12 NOTE — BH INPATIENT PSYCHIATRY PROGRESS NOTE - NSBHFUPINTERVALHXFT_PSY_A_CORE
f/up psychosis, care discussed with tx team, VSS, Patient reported that she feels less paranoid, denied AH, observed to be more pleasant, visible on the unit. Patient reported sleeping well and with good appetite. Instructed that HS Klonopin to be discontinued. Reported that lack of sleep contributed to her decompensation. Encouraged patient to work on coping skills. Spoke to father who stated that patient disclosed to mother that she had overdosed on about 50 tabs of fathers' statin medication, and this happened likely 1-2 days prior to admission. Father realized this when he went to refill his statin medication. Discussed tx plan and current status of patient.

## 2022-01-12 NOTE — BH TREATMENT PLAN - NSTXDCOPLKINTERSW_PSY_ALL_CORE
SW suggested pt work with a therapist and psychiatrist upon discharge for ongoing treatment.
SW will meet with pt to provide psychoed and support towards pt's goal. SW will identify and create appropriate referrals for follow-up care. SW meet with multidisciplinary team daily for updates on pt's goal progress.

## 2022-01-12 NOTE — BH TREATMENT PLAN - NSTXSUICIDINTERRN_PSY_ALL_CORE
Encourage patient to attend groups to learn new DBT/coping skills. Assist patient in utilizing skills when triggers or stressors arise.
Encourage patient to attend groups to learn new DBT/coping skills. Assist patient in utilizing skills when triggers or stressors arise.

## 2022-01-12 NOTE — BH INPATIENT PSYCHIATRY PROGRESS NOTE - CURRENT MEDICATION
MEDICATIONS  (STANDING):  risperiDONE  Disintegrating Tablet 2.5 milliGRAM(s) Oral at bedtime    MEDICATIONS  (PRN):  acetaminophen     Tablet .. 650 milliGRAM(s) Oral every 6 hours PRN Mild Pain (1 - 3), Moderate Pain (4 - 6)  aluminum hydroxide/magnesium hydroxide/simethicone Suspension 30 milliLiter(s) Oral every 6 hours PRN Dyspepsia  diphenhydrAMINE 25 milliGRAM(s) Oral every 6 hours PRN agitation, EPS prophylaxis  diphenhydrAMINE Injectable 25 milliGRAM(s) IntraMuscular once PRN agitation, EPS prophylaxis  haloperidol     Tablet 5 milliGRAM(s) Oral every 6 hours PRN agitation  haloperidol    Injectable 2 milliGRAM(s) IntraMuscular once PRN severe agitation  LORazepam     Tablet 2 milliGRAM(s) Oral every 6 hours PRN anxiety  LORazepam   Injectable 1 milliGRAM(s) IntraMuscular once PRN Agitation  magnesium hydroxide Suspension 30 milliLiter(s) Oral daily PRN Constipation  melatonin. 3 milliGRAM(s) Oral at bedtime PRN Insomnia

## 2022-01-12 NOTE — BH TREATMENT PLAN - NSTXPSYCHOINTERPR_PSY_ALL_CORE
Over the next 7 days, Psychiatric Rehabilitation staff will utilize individual psychotherapy and psychoeducation to assist the patient in reaching her treatment goal.

## 2022-01-12 NOTE — BH TREATMENT PLAN - NSCMSPTSTRENGTHS_PSY_ALL_CORE
Expressive of emotions/Future/goal oriented/Highly motivated for treatment/Intact family/Interpersonal skills/Motivated/Physically healthy/Strong support system/Supportive family
Expressive of emotions/Future/goal oriented/Highly motivated for treatment/Intact family/Interpersonal skills/Motivated/Physically healthy/Strong support system/Supportive family

## 2022-01-13 PROCEDURE — 99232 SBSQ HOSP IP/OBS MODERATE 35: CPT

## 2022-01-13 RX ORDER — RISPERIDONE 4 MG/1
3 TABLET ORAL AT BEDTIME
Refills: 0 | Status: DISCONTINUED | OUTPATIENT
Start: 2022-01-13 | End: 2022-01-19

## 2022-01-13 RX ADMIN — RISPERIDONE 3 MILLIGRAM(S): 4 TABLET ORAL at 20:26

## 2022-01-13 RX ADMIN — Medication 3 MILLIGRAM(S): at 22:11

## 2022-01-13 RX ADMIN — Medication 0.5 MILLIGRAM(S): at 08:32

## 2022-01-13 NOTE — BH INPATIENT PSYCHIATRY PROGRESS NOTE - NSBHASSESSSUMMFT_PSY_ALL_CORE
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 She presents with delusions of guilt (i.e., that she harmed someone in the hospital and should be arrested), and numerous recent parasuicidal behaviors versus aborted suicide attempts. She has voiced that she wanted to kill herself to her parents and was ambivalent about suicidality today after engaging in suicidal behavior yesterday.  Patient's presentation is concerning for with first episode psychosis, though the differential is broad.  Patient is at elevated risk of harm to self at this time due to psychosis and suicidal ideation and she requires inpatient hospitalization on a psychiatric unit for safety and stabilization and diagnostic clarity.     Patient focused on discharge,  calmer, less paranoid, no AH. tolerating Risperdal.     1. admit to inpatient psychiatry  2. routine care, no need for CO at this time, patient denied suicidal ideation  3. increase Risperdal  3 mg hs for psychosis--discussed indications, SE, c/w  Klonopin 0.5mg daily and c/w prns to Haldol 5mg oral and ativan 2mg prn  4. tx will include milieu, individual therapy  5. disposition: SW to explore ETP

## 2022-01-13 NOTE — BH INPATIENT PSYCHIATRY PROGRESS NOTE - NSBHFUPINTERVALHXFT_PSY_A_CORE
f/up psychosis, care discussed with tx team, JANIS. Patient reported feeling "okay", did discuss about patient OD on the pills at home. Reported that it was impulsive and patient was likely to be disorganized. Patient in agreement with parents holding onto the medications. Patient reported fair sleep and appetite. reported coping skills such as deep breathing, mindfulness. Is less paranoid and denied AH.

## 2022-01-13 NOTE — BH INPATIENT PSYCHIATRY PROGRESS NOTE - NSBHCHARTREVIEWVS_PSY_A_CORE FT
Vital Signs Last 24 Hrs  T(C): 36.3 (01-13-22 @ 08:33), Max: 36.4 (01-12-22 @ 20:50)  T(F): 97.3 (01-13-22 @ 08:33), Max: 97.5 (01-12-22 @ 20:50)  HR: --  BP: --  BP(mean): --  RR: --  SpO2: --    Orthostatic VS  01-13-22 @ 08:33  Lying BP: --/-- HR: --  Sitting BP: 107/69 HR: 88  Standing BP: 101/81 HR: 132  Site: upper left arm  Mode: electronic  Orthostatic VS  01-12-22 @ 08:48  Lying BP: --/-- HR: --  Sitting BP: 112/64 HR: 92  Standing BP: 113/85 HR: 95  Site: --  Mode: --

## 2022-01-13 NOTE — BH INPATIENT PSYCHIATRY PROGRESS NOTE - CURRENT MEDICATION
MEDICATIONS  (STANDING):  clonazePAM  Tablet 0.5 milliGRAM(s) Oral daily  risperiDONE  Disintegrating Tablet 3 milliGRAM(s) Oral at bedtime    MEDICATIONS  (PRN):  acetaminophen     Tablet .. 650 milliGRAM(s) Oral every 6 hours PRN Mild Pain (1 - 3), Moderate Pain (4 - 6)  aluminum hydroxide/magnesium hydroxide/simethicone Suspension 30 milliLiter(s) Oral every 6 hours PRN Dyspepsia  diphenhydrAMINE 25 milliGRAM(s) Oral every 6 hours PRN agitation, EPS prophylaxis  diphenhydrAMINE Injectable 25 milliGRAM(s) IntraMuscular once PRN agitation, EPS prophylaxis  haloperidol     Tablet 5 milliGRAM(s) Oral every 6 hours PRN agitation  haloperidol    Injectable 2 milliGRAM(s) IntraMuscular once PRN severe agitation  LORazepam     Tablet 2 milliGRAM(s) Oral every 6 hours PRN anxiety  LORazepam   Injectable 1 milliGRAM(s) IntraMuscular once PRN Agitation  magnesium hydroxide Suspension 30 milliLiter(s) Oral daily PRN Constipation  melatonin. 3 milliGRAM(s) Oral at bedtime PRN Insomnia

## 2022-01-14 PROCEDURE — 99232 SBSQ HOSP IP/OBS MODERATE 35: CPT

## 2022-01-14 RX ADMIN — Medication 3 MILLIGRAM(S): at 20:06

## 2022-01-14 RX ADMIN — RISPERIDONE 3 MILLIGRAM(S): 4 TABLET ORAL at 20:06

## 2022-01-14 RX ADMIN — Medication 0.5 MILLIGRAM(S): at 08:43

## 2022-01-14 NOTE — BH PSYCHOLOGY - CLINICIAN PSYCHOTHERAPY NOTE - NSBHPSYCHOLGOALS_PSY_A_CORE
Decrease symptoms/Assessment/Psychoeducation
Decrease symptoms/Improve social/vocational/coping skills/Prevent relapse
Decrease symptoms/Improve level of independent functioning/Prevent relapse
Decrease symptoms/Improve social/vocational/coping skills
Improve social/vocational/coping skills/Prevent relapse/Treatment compliance

## 2022-01-14 NOTE — BH PSYCHOLOGY - CLINICIAN PSYCHOTHERAPY NOTE - NSBHPSYCHOLRESPONSE_PSY_A_CORE
Symptoms reduced/Accepted support
Accepted support
Symptoms reduced/Accepted support
Symptoms reduced/Coping skills acquired/Accepted support
Symptoms reduced/Coping skills acquired/Accepted support

## 2022-01-14 NOTE — BH PSYCHOLOGY - CLINICIAN PSYCHOTHERAPY NOTE - NSBHPSYCHOLINT_PSY_A_CORE
Reality testing/Stress management/Supported coping skills/Supportive therapy
Reality testing/Supportive therapy/Treatment compliance encouraged
Encourage medication compliance/Supported coping skills/Supportive therapy/Treatment compliance encouraged
Dialectical  Behavioral Therapy (DBT)/Encourage medication compliance/Supported coping skills/Supportive therapy
Encourage medication compliance/Problem-solving techniques discussed/Supportive therapy/Treatment compliance encouraged

## 2022-01-14 NOTE — BH PSYCHOLOGY - CLINICIAN PSYCHOTHERAPY NOTE - NSBHPSYCHOLPROBS_PSY_ALL_CORE
Anxiety/Disorganization/Psychosis/Self Injurious Behavior
Anxiety/Impulsivity/Psychosis
Anxiety/Impulsivity/Psychosis/Self Injurious Behavior
Anxiety/Impulsivity/Psychosis/Self Injurious Behavior/Suicidality
Anxiety/Psychosis/Self Injurious Behavior/Suicidality

## 2022-01-14 NOTE — BH INPATIENT PSYCHIATRY PROGRESS NOTE - NSBHFUPINTERVALHXFT_PSY_A_CORE
f/up psychosis, care discussed w/ tx team, YUNIELS. Patient reported looking forward to d/c next week. Patient denied AH and no overt delusions elicited; reported improved sleep and appetite. no tremors, eps or td reported / observed. Reported protective factors such as family, career, dog and friends.

## 2022-01-14 NOTE — BH PSYCHOLOGY - CLINICIAN PSYCHOTHERAPY NOTE - NSBHPSYCHOLNARRATIVE_PSY_A_CORE FT
Patient consented to and participated in individual therapy session with the writer. Patient was casually dressed and presented with adequate hygiene and grooming. Patient was alert, attentive, cooperative, and engaged throughout the session. Her affect was full with euthymic mood. Her thought process was linear and coherent. Thought content was reality-based. Speech was normal in rate, tone, and volume. Patient denied suicidal ideation, plan, or intent.     Writer inquired about patient's experience on the unit, and patient described feeling more thankful about her own circumstances and family as a result of her time here. Patient reported looking forward to the comforts of home. Writer encouraged patient to identify goals to focus on in outpatient treatment, including the stressors and triggers that contributed to her hospitalization, impact of her experience of her mother's illness, and the feelings of self-blame related to her hospitalization. Patient was receptive to feedback.
Patient consented to and participated in individual therapy session with the writer. Patient was casually dressed and presented with adequate hygiene and grooming. Patient was cooperative, and engaged throughout the session. Her affect was constricted and she was tearful. Her thought process was perseverative on hurting family. Patient reported auditory hallucinations and made reference to delusions of harming a patient. Speech was normal in rate, tone, and volume.     Patient reported difficulty sleeping and described being preoccupied with worries about how her actions have impacted her family, as well as hearing voices telling her she is "a piece of shit." She was distraught discussing how she should have been able to handle the stress of her job, or taken care of herself to prevent her breakdown. 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 Writer encouraged patient to reach out to staff when she feels overwhelmed as well as utilize TIP skills and distraction to cope with distress. Patient appeared calmer at the end of session and reported feeling tired.  
Patient consented to and participated in individual therapy session with the writer. Patient was casually dressed in a hospital gown and presented with adequate hygiene and grooming. Patient was alert, attentive, cooperative, and engaged throughout the session. Her affect was constricted and mood appeared anxious. Her thought process was linear and coherent. Thought content was reality-based. Speech was normal in rate, tone, and volume. Patient denied suicidal ideation, plan, or intent.     Writer inquired about prior suicide attempt that patient recently reported to family. Patient stated she had forgotten about this attempt, as everything in the week prior to admit "feels like a blur." Patient stated she was on edge, wanted to die or hurt myself, and that she grab her dad's medication as it was the only thing she found in the cabinet. Patient took a handful of pills, but gagged and spit several out into the toilet. She said she soon had diarrhea but had no other effects. She described this as "impulsive." Writer engaged patient in safety planning in preparation for discharge and informed patient writer would be out next week. Patient was able to meaningfully participate in safety planning. Writer discussed ETP discharge plan. Patient expressed feeling upset and worried about how her future will be impacted by these events. Writer provided support and encourage patient to keep an open mind about the future.
Patient consented to and participated in first individual therapy session with the writer. Patient was casually dressed and presented with fair hygiene and grooming. Patient was alert and cooperative, but guarded throughout the session. Her affect was constricted with low mood. Her thought process was often vague and perseverative. Thought content included paranoid concerns about possible mistakes at work. Speech was normal in rate, tone, and volume. Patient denied suicidal and homicidal ideation, plan, or intent.     Patient reported that prior to her hospitalization she had been feeling overwhelmed and stressed at work. In the three days prior to hospitalization she reported "not feeling like myself" and engaged in suicidal behaviors (taking laundry detergent, attempting to jump out window). She described ruminating on thoughts that she would make a mistake as well as being concerned that a patient would harm her. Patient indicated she made a mistake administering medication, reported this happened "on Tuesday" (12/28), but would not elaborate further. Later stated "I hope I didn't... I don't know" when asked directly if she made a mistake with medication. Patient was focused on the impact her actions had on her family, saying they were very scared and upset about her behavior. She repeated stated "I'm fine" and "I would never do that again" when writer would ask for details or clarification. She denied previous history of SI, but reported she has always been a "paranoid and cautious" person and described herself as anxious and perfectionistic. Reported that her mom carries diagnosis of schizophrenia and that she fears ending up like her. She initially denied AVH, but then said she heard "plumbing" noises sometimes when extremely tired, but had difficulty explaining what she meant by this. Patient appeared to be minimizing and reluctant to share details with writer. She asked multiple times about the possibility of discharge. When asked about her goals for treatment, patient stated "maybe self care.. prioritizing myself." Writer informed patient that we would meet again later this week.  
Patient consented to and participated in individual therapy session with the writer. Patient was casually dressed and presented with adequate hygiene and grooming. Patient was cooperative, and engaged throughout the session. Her affect was constricted and she was tearful. Her thought process was linear and coherent. Thought content was reality based. Speech was normal in rate, tone, and volume.     Patient reported that she continues to feel very tired, but overall feels more like herself. She also denied auditory hallucinations since the weekend. She described how prior to her hospitalization, she believed "everything was out to get me" and began to feel very hopeless. Patient indicated she wants to continue with therapy, work to "organize myself", and develop more confidence at work. Writer encouraged patient to be flexible with her goals of returning to work, in order to give herself time to focus on recovery and avoid future hospitalizations. Patient also discussed how her experiences with her mother influence her feelings about her episode and fear of what it might mean for her future. Patient insisted several times that she was "fine" and that she wanted to return to work.

## 2022-01-14 NOTE — BH INPATIENT PSYCHIATRY PROGRESS NOTE - NSBHASSESSSUMMFT_PSY_ALL_CORE
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 She presents with delusions of guilt (i.e., that she harmed someone in the hospital and should be arrested), and numerous recent parasuicidal behaviors versus aborted suicide attempts. She has voiced that she wanted to kill herself to her parents and was ambivalent about suicidality today after engaging in suicidal behavior yesterday.  Patient's presentation is concerning for with first episode psychosis, though the differential is broad.  Patient is at elevated risk of harm to self at this time due to psychosis and suicidal ideation and she requires inpatient hospitalization on a psychiatric unit for safety and stabilization and diagnostic clarity.     Patient focused on discharge,  calmer, no delusions elicited, no AH. tolerating Risperdal.     1. admit to inpatient psychiatry  2. routine care, no need for CO at this time, patient denied suicidal ideation  3. increase Risperdal  3 mg hs for psychosis--discussed indications, SE, d/c  Klonopin 0.5mg daily and c/w prns to Haldol 5mg oral and ativan 2mg prn  4. tx will include milieu, individual therapy  5. disposition: SW to explore ETP, tentative d/c wed

## 2022-01-14 NOTE — BH PSYCHOLOGY - CLINICIAN PSYCHOTHERAPY NOTE - NSTXPSYCHOGOAL_PSY_ALL_CORE
Will identify 2 coping skills that assist with focus on reality
Will identify 2 coping skills that assist with focus on reality
Will verbalize 1 strategy to successfully cope with psychotic symptoms

## 2022-01-14 NOTE — BH INPATIENT PSYCHIATRY PROGRESS NOTE - CURRENT MEDICATION
MEDICATIONS  (STANDING):  risperiDONE  Disintegrating Tablet 3 milliGRAM(s) Oral at bedtime    MEDICATIONS  (PRN):  acetaminophen     Tablet .. 650 milliGRAM(s) Oral every 6 hours PRN Mild Pain (1 - 3), Moderate Pain (4 - 6)  aluminum hydroxide/magnesium hydroxide/simethicone Suspension 30 milliLiter(s) Oral every 6 hours PRN Dyspepsia  diphenhydrAMINE 25 milliGRAM(s) Oral every 6 hours PRN agitation, EPS prophylaxis  diphenhydrAMINE Injectable 25 milliGRAM(s) IntraMuscular once PRN agitation, EPS prophylaxis  haloperidol     Tablet 5 milliGRAM(s) Oral every 6 hours PRN agitation  haloperidol    Injectable 2 milliGRAM(s) IntraMuscular once PRN severe agitation  LORazepam     Tablet 2 milliGRAM(s) Oral every 6 hours PRN anxiety  LORazepam   Injectable 1 milliGRAM(s) IntraMuscular once PRN Agitation  magnesium hydroxide Suspension 30 milliLiter(s) Oral daily PRN Constipation  melatonin. 3 milliGRAM(s) Oral at bedtime PRN Insomnia

## 2022-01-14 NOTE — BH INPATIENT PSYCHIATRY PROGRESS NOTE - NSBHCHARTREVIEWVS_PSY_A_CORE FT
Vital Signs Last 24 Hrs  T(C): 36.3 (01-14-22 @ 08:43), Max: 36.8 (01-13-22 @ 19:56)  T(F): 97.4 (01-14-22 @ 08:43), Max: 98.3 (01-13-22 @ 19:56)  HR: --  BP: --  BP(mean): --  RR: 17 (01-14-22 @ 08:43) (17 - 17)  SpO2: --    Orthostatic VS  01-14-22 @ 08:43  Lying BP: --/-- HR: --  Sitting BP: 103/66 HR: 99  Standing BP: 107/71 HR: 102  Site: --  Mode: --  Orthostatic VS  01-13-22 @ 08:33  Lying BP: --/-- HR: --  Sitting BP: 107/69 HR: 88  Standing BP: 101/81 HR: 132  Site: upper left arm  Mode: electronic

## 2022-01-15 RX ADMIN — RISPERIDONE 3 MILLIGRAM(S): 4 TABLET ORAL at 20:30

## 2022-01-15 RX ADMIN — Medication 3 MILLIGRAM(S): at 20:31

## 2022-01-16 LAB — SARS-COV-2 RNA SPEC QL NAA+PROBE: SIGNIFICANT CHANGE UP

## 2022-01-16 RX ADMIN — RISPERIDONE 3 MILLIGRAM(S): 4 TABLET ORAL at 20:17

## 2022-01-16 RX ADMIN — Medication 3 MILLIGRAM(S): at 20:17

## 2022-01-17 RX ADMIN — RISPERIDONE 3 MILLIGRAM(S): 4 TABLET ORAL at 21:42

## 2022-01-17 RX ADMIN — Medication 3 MILLIGRAM(S): at 21:43

## 2022-01-18 PROCEDURE — 99231 SBSQ HOSP IP/OBS SF/LOW 25: CPT

## 2022-01-18 RX ORDER — DIPHENHYDRAMINE HCL 50 MG
25 CAPSULE ORAL ONCE
Refills: 0 | Status: COMPLETED | OUTPATIENT
Start: 2022-01-18 | End: 2022-01-18

## 2022-01-18 RX ADMIN — RISPERIDONE 3 MILLIGRAM(S): 4 TABLET ORAL at 21:09

## 2022-01-18 RX ADMIN — Medication 25 MILLIGRAM(S): at 03:01

## 2022-01-18 RX ADMIN — Medication 650 MILLIGRAM(S): at 17:32

## 2022-01-18 RX ADMIN — Medication 650 MILLIGRAM(S): at 16:41

## 2022-01-18 NOTE — BH INPATIENT PSYCHIATRY PROGRESS NOTE - NSBHFUPINTERVALHXFT_PSY_A_CORE
f/up psychosis, care discussed w/ tx team, YUNIELS. Patient reported looking forward to d/c next week. Patient denied AH and no overt delusions elicited; reported improved sleep and appetite. no tremors, eps or td reported / observed. Reported protective factors such as family, career, dog and friends.  f/up psychosis, care discussed w/ tx team, JANIS. Patient reported looking forward to d/c but now anxious related anticipated discharge and also expressing some delusional concerns related to medical errors but responds well to support, reassurance. Patient denied AH; reported some difficulty with sleep last night and fair appetite. no tremors, eps or td reported / observed. Reported protective factors such as family, career, dog and friends. Care is coordinated with patient's father, [phone], expresses concerns but agrees to plan and expresses comfort with plans for discharge tomorrow assuming condition stable.

## 2022-01-18 NOTE — BH INPATIENT PSYCHIATRY PROGRESS NOTE - CURRENT MEDICATION
MEDICATIONS  (STANDING):  risperiDONE  Disintegrating Tablet 3 milliGRAM(s) Oral at bedtime    MEDICATIONS  (PRN):  acetaminophen     Tablet .. 650 milliGRAM(s) Oral every 6 hours PRN Mild Pain (1 - 3), Moderate Pain (4 - 6)  aluminum hydroxide/magnesium hydroxide/simethicone Suspension 30 milliLiter(s) Oral every 6 hours PRN Dyspepsia  diphenhydrAMINE 25 milliGRAM(s) Oral every 6 hours PRN agitation, EPS prophylaxis  diphenhydrAMINE Injectable 25 milliGRAM(s) IntraMuscular once PRN agitation, EPS prophylaxis  haloperidol     Tablet 5 milliGRAM(s) Oral every 6 hours PRN agitation  haloperidol    Injectable 2 milliGRAM(s) IntraMuscular once PRN severe agitation  LORazepam     Tablet 2 milliGRAM(s) Oral every 6 hours PRN anxiety  LORazepam   Injectable 1 milliGRAM(s) IntraMuscular once PRN Agitation  magnesium hydroxide Suspension 30 milliLiter(s) Oral daily PRN Constipation  melatonin. 3 milliGRAM(s) Oral at bedtime PRN Insomnia   MEDICATIONS  (STANDING):    MEDICATIONS  (PRN):

## 2022-01-18 NOTE — BH INPATIENT PSYCHIATRY PROGRESS NOTE - NSBHASSESSSUMMFT_PSY_ALL_CORE
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 She presents with delusions of guilt (i.e., that she harmed someone in the hospital and should be arrested), and numerous recent parasuicidal behaviors versus aborted suicide attempts. She has voiced that she wanted to kill herself to her parents and was ambivalent about suicidality today after engaging in suicidal behavior yesterday.  Patient's presentation is concerning for with first episode psychosis, though the differential is broad.  Patient is at elevated risk of harm to self at this time due to psychosis and suicidal ideation and she requires inpatient hospitalization on a psychiatric unit for safety and stabilization and diagnostic clarity.     Patient focused on discharge,  calmer, no delusions elicited, no AH. tolerating Risperdal.     1. admit to inpatient psychiatry  2. routine care, no need for CO at this time, patient denied suicidal ideation  3. increase Risperdal  3 mg hs for psychosis--discussed indications, SE, d/c  Klonopin 0.5mg daily and c/w prns to Haldol 5mg oral and ativan 2mg prn  4. tx will include milieu, individual therapy  5. 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 She presents with delusions of guilt (i.e., that she harmed someone in the hospital and should be arrested), and numerous recent parasuicidal behaviors versus aborted suicide attempts. She has voiced that she wanted to kill herself to her parents and was ambivalent about suicidality today after engaging in suicidal behavior yesterday.  Patient's presentation is concerning for with first episode psychosis, though the differential is broad.  Patient is at elevated risk of harm to self at this time due to psychosis and suicidal ideation and she requires inpatient hospitalization on a psychiatric unit for safety and stabilization and diagnostic clarity.     Patient focused on discharge but anxious today with some paranoid delusions noted, no AH. tolerating Risperdal.     1. admit to inpatient psychiatry  2. routine care, no need for CO at this time, patient denied suicidal ideation  3. continue Risperdal 3 mg hs for psychosis--well tolerated, and c/w prns to Haldol 5mg oral and ativan 2mg prn  4. tx will include milieu, individual therapy  5. disposition: SW to explore ETP, tentative d/c tomorrow, care d/w father

## 2022-01-18 NOTE — BH INPATIENT PSYCHIATRY PROGRESS NOTE - PRN MEDS
MEDICATIONS  (PRN):  acetaminophen     Tablet .. 650 milliGRAM(s) Oral every 6 hours PRN Mild Pain (1 - 3), Moderate Pain (4 - 6)  aluminum hydroxide/magnesium hydroxide/simethicone Suspension 30 milliLiter(s) Oral every 6 hours PRN Dyspepsia  diphenhydrAMINE 25 milliGRAM(s) Oral every 6 hours PRN agitation, EPS prophylaxis  diphenhydrAMINE Injectable 25 milliGRAM(s) IntraMuscular once PRN agitation, EPS prophylaxis  haloperidol     Tablet 5 milliGRAM(s) Oral every 6 hours PRN agitation  haloperidol    Injectable 2 milliGRAM(s) IntraMuscular once PRN severe agitation  LORazepam     Tablet 2 milliGRAM(s) Oral every 6 hours PRN anxiety  LORazepam   Injectable 1 milliGRAM(s) IntraMuscular once PRN Agitation  magnesium hydroxide Suspension 30 milliLiter(s) Oral daily PRN Constipation  melatonin. 3 milliGRAM(s) Oral at bedtime PRN Insomnia   MEDICATIONS  (PRN):

## 2022-01-18 NOTE — BH CHART NOTE - NSNOTETYPE_PSY_ALL_CORE
Psychology Progress Note

## 2022-01-18 NOTE — BH CHART NOTE - NSPSYPRGNOTEFT_PSY_ALL_CORE
Writer rounded with patient for 10 min this morning to check in and share info on DBT skills, as group meetings are suspended. Today's topic was Emotion Regulation and focused on what emotions do for you, what makes it hard to regulate emotions, and myths about emotions. Writer reviewed handouts on the functions of emotions and on factors that may make it difficult to regulate emotions and discussed relevant examples. We reviewed common unhelpful or inaccurate beliefs about emotions. Patient was engaged and cooperative. She shared that invalidating environments has impacted her own emotional regulation.
Writer and Psychology Extern rounded with patient for 15 minutes in the absence of group meeting to share info on DBT skills. Discussion focused on the Interpersonal Effectiveness module and reviewed the definition of dialectics, how to think and act dialectically, and important opposites to balance. Writer and patient engaged in discussion about dialectics, provided examples of opposite sides that can both be true and related them to their personal experiences. Writer encouraged patient to practice the skill using the practice worksheet.
Writer met with patient for 10 minutes, at patient's request. Her affect was constricted with dysphoric mood. Thought process was loose with paranoid content. Patient reported feeling dizzy and tired from medication. She became tearful stating she did not know why she was here and was having difficulty coping with being hospitalized. She discussed stressful experiences from work that contributed to her paranoia. Writer encouraged patient to utilize coping skills and find ways to help her feel more secure, including talking to staff about her medications to calm her paranoia. Patient was receptive and accepted support.
Writer rounded with patient in absence of group therapy, to provide info on DBT emotion regulation skills: "Accumulating Positive Emotions: Short Term" and "Opposite Action. Writer provided handouts and reviewed activities patient can partake in to increase positive emotions and gave psychoeducation about the DBT skill of opposite action. Patient was engaged, receptive, and curious to learn about these skills, and reported having positive experiences practicing DBT skills she was taught on the unit.   
Writer rounded with patient in absence of group therapy, to provide info on DBT mindfulness skills-- Taking hold of one's mind, "what" skills. Writer provided handouts and reviewed activities patient can engage in to increase mindfulness through observing, describing and participating. Patient was calm, engaged, and identified activities she can engage in while on the unit. 
Rounded with Patient in absence of group therapy, to provide info on DBT skills-- Validation and Recovering from Invalidation. Provided handouts and reviewed the what, why and how of validating others and how to recover from invalidation in order to increase interpersonal effectiveness. Patient identified invalidating moments in the past. Patient was engaged and receptive.
Writer rounded with Patient for 10 minutes in absence of normal group meetings, in order to provide information on DBT skills. Writer provided handouts regarding Distress Tolerance and discussed the use of the TIPP skill in order to change members' body chemistry to reduce emotional reactivity quickly. Patient appeared engaged and receptive to information. 
Writer rounded with Patient in absence of group therapy, to provide info on DBT skills. Writer provided handouts and reviewed the goals and reasoning for using opposite action to manage emotions when the emotions do not fit the facts of a situation. Patient identified her fear of conflict as an emotion that might need opposite action. Patient was engaged and receptive.

## 2022-01-18 NOTE — BH INPATIENT PSYCHIATRY PROGRESS NOTE - NSBHCHARTREVIEWVS_PSY_A_CORE FT
Vital Signs Last 24 Hrs  T(C): 36.4 (01-18-22 @ 08:57), Max: 36.8 (01-17-22 @ 19:38)  T(F): 97.6 (01-18-22 @ 08:57), Max: 98.3 (01-17-22 @ 19:38)  HR: --  BP: --  BP(mean): --  RR: 17 (01-18-22 @ 08:57) (17 - 17)  SpO2: --    Orthostatic VS  01-18-22 @ 08:57  Lying BP: --/-- HR: --  Sitting BP: 117/77 HR: 100  Standing BP: 125/80 HR: 110  Site: --  Mode: --  Orthostatic VS  01-17-22 @ 07:43  Lying BP: --/-- HR: --  Sitting BP: 120/72 HR: 95  Standing BP: 110/68 HR: 79  Site: --  Mode: --   Vital Signs Last 24 Hrs  T(C): --  T(F): --  HR: --  BP: --  BP(mean): --  RR: --  SpO2: --    Orthostatic VS  01-19-22 @ 08:41  Lying BP: --/-- HR: --  Sitting BP: 117/78 HR: 86  Standing BP: 110/71 HR: 100  Site: --  Mode: --

## 2022-01-18 NOTE — BH CHART NOTE - NSBHPTASSESSDT_PSY_A_CORE
05-Jan-2022 11:27
06-Jan-2022 14:54
11-Jan-2022 12:02
12-Jan-2022 11:00
14-Jan-2022 12:10
04-Jan-2022 14:43
07-Jan-2022 13:05
18-Jan-2022 14:09

## 2022-01-19 VITALS — TEMPERATURE: 97 F | RESPIRATION RATE: 19 BRPM

## 2022-01-19 PROCEDURE — 99238 HOSP IP/OBS DSCHRG MGMT 30/<: CPT

## 2022-01-19 RX ORDER — RISPERIDONE 4 MG/1
1 TABLET ORAL
Qty: 30 | Refills: 0
Start: 2022-01-19 | End: 2022-02-17

## 2022-01-19 NOTE — BH INPATIENT PSYCHIATRY PROGRESS NOTE - NSTXSUICIDDATETRGT_PSY_ALL_CORE
11-Jan-2022

## 2022-01-19 NOTE — BH INPATIENT PSYCHIATRY DISCHARGE NOTE - HOSPITAL COURSE
Patient reported looking forward to d/c with noted improvement noted in psychosis. Patient denied AH and no overt delusions elicited; reported improved sleep and appetite. no tremors, eps or td reported / observed. Reported protective factors such as family, career, dog and friends.     The patient has continued to show improvement in symptoms.  She states her depression is much improved, and she denies any anxiety.  She denies any SI, and her behavior has been well controlled.  The patient has been sleeping better, without nightmares.  The patient has been fully engaged in treatment on the unit, compliant with medications, and is looking forward to continuing care as an outpatient.  The patient has support from her family, who are also protective factors for her.  She was able to safety plan appropriately.  The patient’s risk cannot be further decreased with continued inpatient hospitalization.  She is no longer an acute danger to herself or others, and is stable for discharge home.    Discharge medications:  Risperdal 3mg PO QHS   Patient reported looking forward to d/c with noted improvement noted in psychosis. At the time of discharge patient denied AH and no overt delusions elicited; reported improved sleep and appetite. no tremors, eps or td reported / observed. Reported protective factors such as family, career, dog and friends.     The patient has continued to show improvement in symptoms.  She states her depression is much improved, and she denies any anxiety.  She denies any SI, and her behavior has been well controlled.  The patient has been sleeping better, without nightmares.  The patient has been fully engaged in treatment on the unit, compliant with medications, and is looking forward to continuing care as an outpatient.  The patient has support from her family, who are also protective factors for her.  She was able to safety plan appropriately.  The patient’s risk cannot be further decreased with continued inpatient hospitalization.  She is no longer an acute danger to herself or others, and is stable for discharge home.    Discharge medications:  Risperdal 3mg PO QHS    Aftercare Appointment  [facility]  20-Jan-2022 09:00  Zoom -   [phone]

## 2022-01-19 NOTE — BH DISCHARGE NOTE NURSING/SOCIAL WORK/PSYCH REHAB - DISCHARGE INSTRUCTIONS AFTERCARE APPOINTMENTS
In order to check the location, date, or time of your aftercare appointment, please refer to your Discharge Instructions Document given to you upon leaving the hospital.  If you have lost the instructions please call [phone]

## 2022-01-19 NOTE — BH DISCHARGE NOTE NURSING/SOCIAL WORK/PSYCH REHAB - NSDCPRRECOMMEND_PSY_ALL_CORE
Psychiatric Rehabilitation staff recommends that the patient engage in outpatient services for continued medication and symptom management.

## 2022-01-19 NOTE — BH INPATIENT PSYCHIATRY PROGRESS NOTE - NSTXSUICIDDATEEST_PSY_ALL_CORE
04-Jan-2022

## 2022-01-19 NOTE — BH INPATIENT PSYCHIATRY PROGRESS NOTE - NSICDXBHPRIMARYDX_PSY_ALL_CORE
Psychosis, unspecified psychosis type   F29  

## 2022-01-19 NOTE — BH INPATIENT PSYCHIATRY PROGRESS NOTE - MSE OPTIONS
Structured MSE
Unstructured MSE
Structured MSE

## 2022-01-19 NOTE — BH INPATIENT PSYCHIATRY DISCHARGE NOTE - HPI (INCLUDE ILLNESS QUALITY, SEVERITY, DURATION, TIMING, CONTEXT, MODIFYING FACTORS, ASSOCIATED SIGNS AND SYMPTOMS)
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    Collateral obtained from patient's father Sotero. 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 Per dad, this was the first time Doris was unsupervised as a nurse because she is a new nurse and was previously on orientation. She worked overnight on Monday, Tuesday, and Wednesday and she did not sleep during the day because of construction noise in the neighborhood. She has gone at least 3 days with very little sleep. Her father stated that on Thursday night she started to repeat herself, stating "I'm a bad person, I harmed the patient, I killed the patient, I gotta go to [facility], I gotta end my life, I gotta pay for this." She also spends time pacing when she is supposed to be sleeping. When her father tries to tell her that she did not do anything wrong, Doris continues to state that she harmed someone. Yesterday, there were three instances where Doris engaged in concerning behaviors. First, she went to the basement and took out 2 tide pods and her father caught her and asked what she planned to do with the tide pods, she responded "I don't know." She also kept asking her mom about medications in the home, and parents had to hide medications and knives due to concern she would harm herself. Later in the day, Doris went to her room and locked the door. Her dad broke down the door and saw Doris trying to open up the window. She only responded "I don't know I don't know I don't know" when asked what she was doing. Later in the day, her dad says that she seemed to be rational but was "clearly faking it" and she suddenly "bolted" upstairs and ran to the window to try to jump. Her father had to remove her from the window. Her father attempted to get her an appointment with a mental health provider, which is supposed to be later this week.  Dad says that patient's mother has schizophrenia and had similar symptoms, but never this severe. Dad is concerned for the patient's safety and is agreeable with plan for psychiatric hospitalization. Per dad she has always been somewhat anxious, but she has never received psychiatric or psychological treatment. She has never tried to harm herself or talked about killing herself in the past and she has never made a suicide attempt. She does not use drugs or substances. She has never been on psychiatric medication. This behavior is out of character for her, and he is concerned given patient's family history.     On evaluation, the patient is calm and in good behavioral control, though she is guarded with little spontaneous speech, answering "I don't know" to many questions. Her affect is dysphoric and she makes very poor eye contact. She states that she is in the ED because she had a fight with family, including her brother. She stated: "I think I made a mistake." She stated that she recently started work as a nurse at [employer] on a inpatient [facility] medicine unit. She stated that she "fucked up at work." She referenced that she made a mistake at work multiple times, but she would not elaborate, stating "I don't know" or "I can't remember" when asked for details. She later stated that she maybe give someone the wrong medication and then stated;  "I've been making errors, I can't organize my life, I don't know I'm scared." "I think I harmed someone, I don't know." She stated that she has been feeling paranoid lately, which she described as "edgy." She also reports sad mood, though notes that she has happy moments and she denies that she feels persistently sad. She corroborated that she attempted to jump out of a window yesterday and her father stopped her, but she stated "I don't know" when asked if this was a suicide attempt. She acknowledged that she had experienced passive suicidal ideation recently, but she would not elaborate. She shook her head no when asked if she had active suicidal ideation at any point. She denied history of suicide attempts. She has been biting at her arms superficially per dad over last few days. She denies current suicidal ideation, intent, and plan. When asked how she felt that her family stopped her from jumping, she stated "At least I'm not paralyzed." She stated that she has no appetite and lost weight in the past month or two (states that she weighed 125lb and now is 111lb). She states that she usually likes going to the gym, but she has been unable to do this, partially because she is not eating enough. She reported that she feels fatigued, but when she tries to sleep she is unable to and she paces back and fourth. When asked about AH, she stated "I don't know." Denies VH. Denies homicidal, violent ideation, intent, or plan. When asked if she ever had a period of time where she had decreased need for sleep, she referenced the last few nights, but she also noted that she has been feeling tired. No evidence of elevated, euphoric, irritable, or expansive mood. She reported that she drinks socially in the summer, she denies use of cannabis, and she denies use of all illicit substances. She denies access to guns. She also denies that she has any medical issues and she denies that she has any physical pain or physical symptoms. She had 2 covid vaccines".     On assessment this morning, patient reported feeling overwhelmed, continued to perseverate about making errors. 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 Patient was perseverating on making multiple medication errors. Patient also reported self injurious behaviors such banging her foot on the bed frame out of anger. Patient also attempted to jump out the window and when questioned about this, reported that she was trying to scare her father. Patient reported that she felt paranoid during the orientation (on day shift), felt that patients were trying to harm her. Patient reported feeling overwhelmed, reported poor sleep and appetite due to increasing anxiety. Denied medical issues. Patient denied feeling depressed or sad. Patient denied substance use. Currently denied suicidal ideation, intent or plan.

## 2022-01-19 NOTE — BH INPATIENT PSYCHIATRY DISCHARGE NOTE - NSDCCPCAREPLAN_GEN_ALL_CORE_FT
PRINCIPAL DISCHARGE DIAGNOSIS  Diagnosis: Psychosis, unspecified psychosis type  Assessment and Plan of Treatment:       SECONDARY DISCHARGE DIAGNOSES  Diagnosis: Anxiety, generalized  Assessment and Plan of Treatment:

## 2022-01-19 NOTE — BH DISCHARGE NOTE NURSING/SOCIAL WORK/PSYCH REHAB - NSCDUDCCRISIS_PSY_A_CORE
[facility]  1 (459) [facility]LL (428-1714)  Text "WELL" to 69610  Website: www.[employer]/.[facility] 1 (068) 251-WMUB (0019) Website: www.safehorizon.org/.National Suicide Prevention Lifeline 4 (242) 350-8616/.  [facility]  1 (083) LIFENET (810-9021)/.  [facility]  [address] [place] [address] [phone]   Hours:  Monday through Friday from 9 AM to 3 PM/.  [employer] [facility]  [employer] - [facility] Crisis Line  6 [phone], Option 1

## 2022-01-19 NOTE — BH INPATIENT PSYCHIATRY PROGRESS NOTE - NSTXDCOPLKDATETRGT_PSY_ALL_CORE
19-Jan-2022
12-Jan-2022
19-Jan-2022
19-Jan-2022
12-Jan-2022
19-Jan-2022

## 2022-01-19 NOTE — BH INPATIENT PSYCHIATRY PROGRESS NOTE - NSBHATTESTSEENBY_PSY_A_CORE
NP without Attending Psychiatrist
NP without Attending Psychiatrist
attending Psychiatrist without NP/Trainee
NP without Attending Psychiatrist
attending Psychiatrist without NP/Trainee
NP without Attending Psychiatrist
NP without Attending Psychiatrist
attending Psychiatrist without NP/Trainee
NP without Attending Psychiatrist

## 2022-01-19 NOTE — BH INPATIENT PSYCHIATRY PROGRESS NOTE - NSBHMETABOLIC_PSY_ALL_CORE_FT
BMI: BMI (kg/m2): 21.5 (01-04-22 @ 08:51)  HbA1c: A1C with Estimated Average Glucose Result: 4.8 % (01-05-22 @ 09:56)    Glucose:   BP: --  Lipid Panel: Date/Time: 01-05-22 @ 09:56  Cholesterol, Serum: 95  Direct LDL: --  HDL Cholesterol, Serum: 46  Total Cholesterol/HDL Ration Measurement: --  Triglycerides, Serum: 34  
BMI: BMI (kg/m2): 21.5 (01-04-22 @ 08:51)  HbA1c: A1C with Estimated Average Glucose Result: 4.8 % (01-05-22 @ 09:56)    Glucose:   BP: 114/69 (01-07-22 @ 09:24) (101/60 - 114/69)  Lipid Panel: Date/Time: 01-05-22 @ 09:56  Cholesterol, Serum: 95  Direct LDL: --  HDL Cholesterol, Serum: 46  Total Cholesterol/HDL Ration Measurement: --  Triglycerides, Serum: 34  
BMI: BMI (kg/m2): 21.5 (01-04-22 @ 08:51)  HbA1c: A1C with Estimated Average Glucose Result: 4.8 % (01-05-22 @ 09:56)    Glucose:   BP: 101/60 (01-06-22 @ 08:37) (101/60 - 101/60)  Lipid Panel: Date/Time: 01-05-22 @ 09:56  Cholesterol, Serum: 95  Direct LDL: --  HDL Cholesterol, Serum: 46  Total Cholesterol/HDL Ration Measurement: --  Triglycerides, Serum: 34  
BMI: BMI (kg/m2): 21.5 (01-04-22 @ 08:51)  HbA1c: A1C with Estimated Average Glucose Result: 4.8 % (01-05-22 @ 09:56)    Glucose:   BP: 114/69 (01-07-22 @ 09:24) (101/60 - 114/69)  Lipid Panel: Date/Time: 01-05-22 @ 09:56  Cholesterol, Serum: 95  Direct LDL: --  HDL Cholesterol, Serum: 46  Total Cholesterol/HDL Ration Measurement: --  Triglycerides, Serum: 34  
BMI: BMI (kg/m2): 21.5 (01-04-22 @ 08:51)  HbA1c: A1C with Estimated Average Glucose Result: 4.8 % (01-05-22 @ 09:56)    Glucose:   BP: --  Lipid Panel: Date/Time: 01-05-22 @ 09:56  Cholesterol, Serum: 95  Direct LDL: --  HDL Cholesterol, Serum: 46  Total Cholesterol/HDL Ration Measurement: --  Triglycerides, Serum: 34  

## 2022-01-19 NOTE — BH INPATIENT PSYCHIATRY PROGRESS NOTE - NSTXPSYCHOINTERMD_PSY_ALL_CORE
psychopharmacology

## 2022-01-19 NOTE — BH DISCHARGE NOTE NURSING/SOCIAL WORK/PSYCH REHAB - NSDCPRGOAL_PSY_ALL_CORE
Upon admission, the patient was admitted due to her family calling EMS due to a suicide attempt. The patient was observed on the unit as engaging with her peers, adhering to her medications, and engaging with her treatment. During the final interview, the patient was engaged and had a calm demeanor. Due to Covid-19 precautions, all groups have been suspended, staff have been conducting individual rounds and have been giving out activity sheets. The patient used the leisure and psychoeducation activity sheets and stated that they have been extremely helpful for her with her treatment. The patient made improvement on her psychiatric rehabilitation goal of verbalizing 1 strategy to cope with her psychotic symptoms. The patient’s coping strategies are listening to music, positive thinking, speaking with others, and playing card games. The patient stated she is excited, feels more confident, and happy about being discharged. The patient exhibits good ADL’s, behavioral control and is compliant with her medications. The patient denies SI/HI/VH/AH.

## 2022-01-19 NOTE — BH INPATIENT PSYCHIATRY PROGRESS NOTE - NSTXANXINTERMD_PSY_ALL_CORE
psychopharmacology, coping skills

## 2022-01-19 NOTE — BH INPATIENT PSYCHIATRY PROGRESS NOTE - NSICDXBHTERTIARYDX_PSY_ALL_CORE
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  

## 2022-01-19 NOTE — BH INPATIENT PSYCHIATRY PROGRESS NOTE - NSBHINPTBILLING_PSY_ALL_CORE
99913 - Inpatient Moderate Complexity
71358 - Inpatient Moderate Complexity
23029 - Inpatient Low Complexity
28849 - Inpatient Moderate Complexity
31376 - Inpatient Moderate Complexity
06026 - Hospital Discharge Day Management; 30 min or less
21185 - Inpatient Moderate Complexity
55028 - Inpatient Moderate Complexity
06150 - Inpatient Low Complexity
19232 - Inpatient Moderate Complexity
78644 - Inpatient Moderate Complexity

## 2022-01-19 NOTE — BH INPATIENT PSYCHIATRY PROGRESS NOTE - NSTXSUICIDGOAL_PSY_ALL_CORE
Will identify and utilize 2 coping skills
Be able to state 3 reasons for living
Will identify and utilize 2 coping skills
Be able to state 3 reasons for living
Be able to state 3 reasons for living
Will identify and utilize 2 coping skills

## 2022-01-19 NOTE — BH DISCHARGE NOTE NURSING/SOCIAL WORK/PSYCH REHAB - PATIENT PORTAL LINK FT
You can access the FollowMyHealth Patient Portal offered by [facility] by registering at the following website: [email or web address]. By joining [employer]’s FollowMyHealth portal, you will also be able to view your health information using other applications (apps) compatible with our system.

## 2022-01-19 NOTE — BH INPATIENT PSYCHIATRY PROGRESS NOTE - NSBHMSEPERCEPT_PSY_A_CORE
No abnormalities
No abnormalities
Auditory hallucinations
Auditory hallucinations
No abnormalities
Auditory hallucinations
No abnormalities
No abnormalities
Auditory hallucinations
No abnormalities

## 2022-01-19 NOTE — BH INPATIENT PSYCHIATRY PROGRESS NOTE - NSBHASSESSSUMMFT_PSY_ALL_CORE
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 She presents with delusions of guilt (i.e., that she harmed someone in the hospital and should be arrested), and numerous recent parasuicidal behaviors versus aborted suicide attempts. She has voiced that she wanted to kill herself to her parents and was ambivalent about suicidality today after engaging in suicidal behavior yesterday.  Patient's presentation is concerning for with first episode psychosis, though the differential is broad.  Patient is at elevated risk of harm to self at this time due to psychosis and suicidal ideation and she requires inpatient hospitalization on a psychiatric unit for safety and stabilization and diagnostic clarity.     Patient focused on discharge,  calmer, no delusions elicited, no AH. tolerating Risperdal.     1. admit to inpatient psychiatry  2. routine care, no need for CO at this time, patient denied suicidal ideation  3. increase Risperdal  3 mg hs for psychosis--discussed indications, SE, d/c  Klonopin 0.5mg daily and c/w prns to Haldol 5mg oral and ativan 2mg prn  4. tx will include milieu, individual therapy  5. 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 She presents with delusions of guilt (i.e., that she harmed someone in the hospital and should be arrested), and numerous recent parasuicidal behaviors versus aborted suicide attempts. She has voiced that she wanted to kill herself to her parents and was ambivalent about suicidality today after engaging in suicidal behavior yesterday.  Patient's presentation is concerning for with first episode psychosis, though the differential is broad.  Patient is at elevated risk of harm to self at this time due to psychosis and suicidal ideation and she requires inpatient hospitalization on a psychiatric unit for safety and stabilization and diagnostic clarity.     Patient focused on discharge today and positive, future oriented, calmer, no delusions elicited, no AH. tolerating Risperdal.     1. discharge to home today  2. continue Risperdal 3 mg hs for psychosis--discussed indications, SE, script filled at [facility]  3. disposition: SW referral made to ETP, with f/u set up for 1/20

## 2022-01-19 NOTE — BH INPATIENT PSYCHIATRY PROGRESS NOTE - NSTXDCOPLKDATEEST_PSY_ALL_CORE
05-Jan-2022
05-Jan-2022
12-Jan-2022
12-Jan-2022
05-Jan-2022
12-Jan-2022
12-Jan-2022
05-Jan-2022

## 2022-01-19 NOTE — BH INPATIENT PSYCHIATRY PROGRESS NOTE - NSBHMSEBODY_PSY_A_CORE
Underweight

## 2022-01-19 NOTE — BH DISCHARGE NOTE NURSING/SOCIAL WORK/PSYCH REHAB - NSBHREFERPURPOSE1_PSY_ALL_CORE
This appointment will be held on Zoom. The clinic will not send an email to the patient, please enter the Zoom ID number to access the appointment./Mental Health Treatment

## 2022-01-19 NOTE — BH INPATIENT PSYCHIATRY PROGRESS NOTE - NSBHMSETHTPROC_PSY_A_CORE
Linear
Linear
Circumstantial/Perseverative/Illogical/Impaired reasoning
Linear
Linear
Circumstantial/Perseverative/Illogical/Impaired reasoning
Linear
Linear

## 2022-01-19 NOTE — BH INPATIENT PSYCHIATRY PROGRESS NOTE - NSTXPSYCHOGOAL_PSY_ALL_CORE
Will identify 2 coping skills that assist with focus on reality
Will verbalize 1 strategy to successfully cope with psychotic symptoms
Will identify 2 coping skills that help mitigate hallucinations
Will identify 2 coping skills that assist with focus on reality
Will verbalize 1 strategy to successfully cope with psychotic symptoms

## 2022-01-19 NOTE — BH INPATIENT PSYCHIATRY PROGRESS NOTE - NSICDXBHSECONDARYDX_PSY_ALL_CORE
Anxiety, generalized   F41.1  Reaction, adjustment   F43.20  

## 2022-01-19 NOTE — BH INPATIENT PSYCHIATRY DISCHARGE NOTE - NSBHMETABOLIC_PSY_ALL_CORE_FT
BMI: BMI (kg/m2): 21.5 (01-04-22 @ 08:51)  HbA1c: A1C with Estimated Average Glucose Result: 4.8 % (01-05-22 @ 09:56)    Glucose:   BP: --  Lipid Panel: Date/Time: 01-05-22 @ 09:56  Cholesterol, Serum: 95  Direct LDL: --  HDL Cholesterol, Serum: 46  Total Cholesterol/HDL Ration Measurement: --  Triglycerides, Serum: 34

## 2022-01-19 NOTE — BH INPATIENT PSYCHIATRY PROGRESS NOTE - NSTXSUICIDPROGRES_PSY_ALL_CORE
Met - goal discontinued

## 2022-01-19 NOTE — BH INPATIENT PSYCHIATRY PROGRESS NOTE - NSBHFUPINTERVALHXFT_PSY_A_CORE
f/up psychosis, care discussed w/ tx team, JANIS. Patient reported looking forward to d/c next week. Patient denied AH and no overt delusions elicited; reported improved sleep and appetite. no tremors, eps or td reported / observed. Reported protective factors such as family, career, dog and friends.     The patient has continued to show improvement in symptoms.  She states her depression is much improved, and she denies any anxiety.  She denies any SI, and her behavior has been well controlled.  The patient has been sleeping better, without nightmares.  The patient has been fully engaged in treatment on the unit, compliant with medications, and is looking forward to continuing care as an outpatient.  The patient has support from her family, who are also protective factors for her.  She was able to safety plan appropriately.  The patient’s risk cannot be further decreased with continued inpatient hospitalization.  She is no longer an acute danger to herself or others, and is stable for discharge home.  f/up psychosis, care discussed w/ tx team, JANIS. Patient reported looking forward to d/c today. Patient denied AH and no overt delusions elicited; reported improved sleep and appetite. no tremors, eps or td reported / observed. Reported protective factors such as family, career, dog and friends.     The patient has continued to show improvement in symptoms.  She states her depression is much improved, and she denies any anxiety.  She denies any SI, and her behavior has been well controlled.  The patient has been sleeping better, without nightmares.  The patient has been fully engaged in treatment on the unit, compliant with medications, and is looking forward to continuing care as an outpatient.  The patient has support from her family, who are also protective factors for her.  She was able to safety plan appropriately.  The patient’s risk cannot be further decreased with continued inpatient hospitalization.  She is no longer an acute danger to herself or others, and is stable for discharge home.

## 2022-01-19 NOTE — BH INPATIENT PSYCHIATRY PROGRESS NOTE - NSBHCHARTREVIEWVS_PSY_A_CORE FT
Vital Signs Last 24 Hrs  T(C): 36.3 (01-19-22 @ 08:41), Max: 37 (01-18-22 @ 19:20)  T(F): 97.3 (01-19-22 @ 08:41), Max: 98.6 (01-18-22 @ 19:20)  HR: --  BP: --  BP(mean): --  RR: 19 (01-19-22 @ 08:41) (19 - 19)  SpO2: --    Orthostatic VS  01-19-22 @ 08:41  Lying BP: --/-- HR: --  Sitting BP: 117/78 HR: 86  Standing BP: 110/71 HR: 100  Site: --  Mode: --  Orthostatic VS  01-18-22 @ 08:57  Lying BP: --/-- HR: --  Sitting BP: 117/77 HR: 100  Standing BP: 125/80 HR: 110  Site: --  Mode: --   Vital Signs Last 24 Hrs  T(C): --  T(F): --  HR: --  BP: --  BP(mean): --  RR: --  SpO2: --    Orthostatic VS  01-19-22 @ 08:41  Lying BP: --/-- HR: --  Sitting BP: 117/78 HR: 86  Standing BP: 110/71 HR: 100  Site: --  Mode: --

## 2022-01-19 NOTE — BH INPATIENT PSYCHIATRY PROGRESS NOTE - NSBHMSEKNOWHOW_PSY_ALL_CORE
Educational attainment/Vocabulary

## 2022-01-20 ENCOUNTER — OUTPATIENT (OUTPATIENT)
Dept: OUTPATIENT SERVICES | Facility: HOSPITAL | Age: 22
LOS: 1 days | Discharge: HOME | End: 2022-01-20
Payer: COMMERCIAL

## 2022-01-20 PROCEDURE — 99214 OFFICE O/P EST MOD 30 MIN: CPT | Mod: 95

## 2022-01-20 PROCEDURE — 90791 PSYCH DIAGNOSTIC EVALUATION: CPT | Mod: 95

## 2022-01-27 PROCEDURE — 99214 OFFICE O/P EST MOD 30 MIN: CPT | Mod: 95

## 2022-02-03 PROCEDURE — 99214 OFFICE O/P EST MOD 30 MIN: CPT | Mod: 95

## 2022-02-08 PROCEDURE — 90832 PSYTX W PT 30 MINUTES: CPT | Mod: 95

## 2022-02-14 PROCEDURE — 90837 PSYTX W PT 60 MINUTES: CPT | Mod: 95

## 2022-02-22 PROCEDURE — 90832 PSYTX W PT 30 MINUTES: CPT | Mod: 95

## 2022-02-22 PROCEDURE — 99214 OFFICE O/P EST MOD 30 MIN: CPT | Mod: 95

## 2022-02-24 PROCEDURE — 90837 PSYTX W PT 60 MINUTES: CPT | Mod: 95

## 2022-02-25 DIAGNOSIS — F29 UNSPECIFIED PSYCHOSIS NOT DUE TO A SUBSTANCE OR KNOWN PHYSIOLOGICAL CONDITION: ICD-10-CM

## 2022-03-03 PROCEDURE — 90834 PSYTX W PT 45 MINUTES: CPT | Mod: 95

## 2022-03-14 PROCEDURE — 90837 PSYTX W PT 60 MINUTES: CPT | Mod: 95

## 2022-03-21 PROCEDURE — 99214 OFFICE O/P EST MOD 30 MIN: CPT | Mod: 95

## 2022-03-24 PROCEDURE — 90834 PSYTX W PT 45 MINUTES: CPT | Mod: 95

## 2022-03-31 PROCEDURE — 90834 PSYTX W PT 45 MINUTES: CPT | Mod: 1L,95

## 2022-04-07 PROCEDURE — 99214 OFFICE O/P EST MOD 30 MIN: CPT | Mod: 95

## 2022-04-07 PROCEDURE — 90834 PSYTX W PT 45 MINUTES: CPT | Mod: 95

## 2022-04-14 PROCEDURE — 90837 PSYTX W PT 60 MINUTES: CPT | Mod: 95

## 2022-04-28 PROCEDURE — 90832 PSYTX W PT 30 MINUTES: CPT | Mod: 95

## 2022-05-31 PROCEDURE — 90834 PSYTX W PT 45 MINUTES: CPT | Mod: 95

## 2022-07-22 PROCEDURE — 90837 PSYTX W PT 60 MINUTES: CPT | Mod: 95

## 2022-08-01 PROCEDURE — 99214 OFFICE O/P EST MOD 30 MIN: CPT | Mod: 95

## 2022-08-01 PROCEDURE — 90837 PSYTX W PT 60 MINUTES: CPT | Mod: 95

## 2022-08-09 PROCEDURE — 90837 PSYTX W PT 60 MINUTES: CPT | Mod: 95

## 2022-12-23 NOTE — BH PSYCHOLOGY - CLINICIAN PSYCHOTHERAPY NOTE - NSTXSUICIDGOAL_PSY_ALL_CORE
Will identify and utilize 2 coping skills
Impaired gait

## 2023-11-20 NOTE — ED ADULT NURSE NOTE - BREATHING, MLM
Render Post-Care Instructions In Note?: no Consent: The patient's consent was obtained including but not limited to risks of crusting, scabbing, blistering, scarring, darker or lighter pigmentary change, recurrence, incomplete removal and infection. Detail Level: Detailed Show Applicator Variable?: Yes Post-Care Instructions: I reviewed with the patient in detail post-care instructions. Patient is to wear sunprotection, and avoid picking at any of the treated lesions. Pt may apply Vaseline to crusted or scabbing areas. Duration Of Freeze Thaw-Cycle (Seconds): 5 Spontaneous, unlabored and symmetrical

## 2024-11-05 NOTE — BH PSYCHOLOGY - CLINICIAN PSYCHOTHERAPY NOTE - NSTXDCOPLKDATEEST_PSY_ALL_CORE
Daly Szymanski called to request a refill on her medication.      Last office visit : 7/23/2024   Next office visit : 11/21/2024     Requested Prescriptions     Pending Prescriptions Disp Refills    sertraline (ZOLOFT) 100 MG tablet 90 tablet 1     Sig: Take 1 tablet by mouth daily            Nancy Ro MA   12-Jan-2022